# Patient Record
Sex: FEMALE | Race: WHITE | HISPANIC OR LATINO | Employment: FULL TIME | URBAN - METROPOLITAN AREA
[De-identification: names, ages, dates, MRNs, and addresses within clinical notes are randomized per-mention and may not be internally consistent; named-entity substitution may affect disease eponyms.]

---

## 2019-10-01 ENCOUNTER — APPOINTMENT (EMERGENCY)
Dept: CT IMAGING | Facility: HOSPITAL | Age: 39
End: 2019-10-01
Payer: COMMERCIAL

## 2019-10-01 ENCOUNTER — HOSPITAL ENCOUNTER (EMERGENCY)
Facility: HOSPITAL | Age: 39
Discharge: HOME | End: 2019-10-02
Attending: EMERGENCY MEDICINE | Admitting: EMERGENCY MEDICINE
Payer: COMMERCIAL

## 2019-10-01 DIAGNOSIS — K55.1 MESENTERIC VASCULAR INSUFFICIENCY (HCC): ICD-10-CM

## 2019-10-01 DIAGNOSIS — K45.8 INTERNAL HERNIA: ICD-10-CM

## 2019-10-01 DIAGNOSIS — R10.84 GENERALIZED ABDOMINAL PAIN: Primary | ICD-10-CM

## 2019-10-01 LAB
ALBUMIN SERPL BCP-MCNC: 3.9 G/DL (ref 3.5–5)
ALP SERPL-CCNC: 96 U/L (ref 46–116)
ALT SERPL W P-5'-P-CCNC: 7 U/L (ref 12–78)
ANION GAP SERPL CALCULATED.3IONS-SCNC: 10 MMOL/L (ref 4–13)
AST SERPL W P-5'-P-CCNC: 18 U/L (ref 5–45)
BASOPHILS # BLD AUTO: 0.04 THOUSANDS/ΜL (ref 0–0.1)
BASOPHILS NFR BLD AUTO: 0 % (ref 0–1)
BILIRUB SERPL-MCNC: 0.4 MG/DL (ref 0.2–1)
BILIRUB UR QL STRIP: NEGATIVE
BUN SERPL-MCNC: 13 MG/DL (ref 5–25)
CALCIUM SERPL-MCNC: 8.9 MG/DL (ref 8.3–10.1)
CHLORIDE SERPL-SCNC: 100 MMOL/L (ref 100–108)
CLARITY UR: CLEAR
CO2 SERPL-SCNC: 27 MMOL/L (ref 21–32)
COLOR UR: YELLOW
CREAT SERPL-MCNC: 0.69 MG/DL (ref 0.6–1.3)
EOSINOPHIL # BLD AUTO: 0.01 THOUSAND/ΜL (ref 0–0.61)
EOSINOPHIL NFR BLD AUTO: 0 % (ref 0–6)
ERYTHROCYTE [DISTWIDTH] IN BLOOD BY AUTOMATED COUNT: 13.1 % (ref 11.6–15.1)
GFR SERPL CREATININE-BSD FRML MDRD: 108 ML/MIN/1.73SQ M
GLUCOSE SERPL-MCNC: 168 MG/DL (ref 65–140)
GLUCOSE UR STRIP-MCNC: NEGATIVE MG/DL
HCG SERPL QL: NEGATIVE
HCT VFR BLD AUTO: 36.2 % (ref 34.8–46.1)
HGB BLD-MCNC: 11.8 G/DL (ref 11.5–15.4)
HGB UR QL STRIP.AUTO: NEGATIVE
IMM GRANULOCYTES # BLD AUTO: 0.06 THOUSAND/UL (ref 0–0.2)
IMM GRANULOCYTES NFR BLD AUTO: 0 % (ref 0–2)
KETONES UR STRIP-MCNC: NEGATIVE MG/DL
LEUKOCYTE ESTERASE UR QL STRIP: NEGATIVE
LIPASE SERPL-CCNC: 120 U/L (ref 73–393)
LYMPHOCYTES # BLD AUTO: 0.98 THOUSANDS/ΜL (ref 0.6–4.47)
LYMPHOCYTES NFR BLD AUTO: 6 % (ref 14–44)
MCH RBC QN AUTO: 29.1 PG (ref 26.8–34.3)
MCHC RBC AUTO-ENTMCNC: 32.6 G/DL (ref 31.4–37.4)
MCV RBC AUTO: 89 FL (ref 82–98)
MONOCYTES # BLD AUTO: 0.48 THOUSAND/ΜL (ref 0.17–1.22)
MONOCYTES NFR BLD AUTO: 3 % (ref 4–12)
NEUTROPHILS # BLD AUTO: 14.18 THOUSANDS/ΜL (ref 1.85–7.62)
NEUTS SEG NFR BLD AUTO: 91 % (ref 43–75)
NITRITE UR QL STRIP: NEGATIVE
NRBC BLD AUTO-RTO: 0 /100 WBCS
PH UR STRIP.AUTO: 6 [PH]
PLATELET # BLD AUTO: 302 THOUSANDS/UL (ref 149–390)
PMV BLD AUTO: 10.1 FL (ref 8.9–12.7)
POTASSIUM SERPL-SCNC: 4.5 MMOL/L (ref 3.5–5.3)
PROT SERPL-MCNC: 7.8 G/DL (ref 6.4–8.2)
PROT UR STRIP-MCNC: NEGATIVE MG/DL
RBC # BLD AUTO: 4.06 MILLION/UL (ref 3.81–5.12)
SODIUM SERPL-SCNC: 137 MMOL/L (ref 136–145)
SP GR UR STRIP.AUTO: 1.02 (ref 1–1.03)
UROBILINOGEN UR QL STRIP.AUTO: 0.2 E.U./DL
WBC # BLD AUTO: 15.75 THOUSAND/UL (ref 4.31–10.16)

## 2019-10-01 PROCEDURE — 99284 EMERGENCY DEPT VISIT MOD MDM: CPT

## 2019-10-01 PROCEDURE — 81003 URINALYSIS AUTO W/O SCOPE: CPT | Performed by: EMERGENCY MEDICINE

## 2019-10-01 PROCEDURE — 99285 EMERGENCY DEPT VISIT HI MDM: CPT | Performed by: EMERGENCY MEDICINE

## 2019-10-01 PROCEDURE — 85025 COMPLETE CBC W/AUTO DIFF WBC: CPT | Performed by: EMERGENCY MEDICINE

## 2019-10-01 PROCEDURE — 74177 CT ABD & PELVIS W/CONTRAST: CPT

## 2019-10-01 PROCEDURE — 84703 CHORIONIC GONADOTROPIN ASSAY: CPT | Performed by: EMERGENCY MEDICINE

## 2019-10-01 PROCEDURE — 96374 THER/PROPH/DIAG INJ IV PUSH: CPT

## 2019-10-01 PROCEDURE — 83690 ASSAY OF LIPASE: CPT | Performed by: EMERGENCY MEDICINE

## 2019-10-01 PROCEDURE — 96361 HYDRATE IV INFUSION ADD-ON: CPT

## 2019-10-01 PROCEDURE — 96375 TX/PRO/DX INJ NEW DRUG ADDON: CPT

## 2019-10-01 PROCEDURE — 36415 COLL VENOUS BLD VENIPUNCTURE: CPT | Performed by: EMERGENCY MEDICINE

## 2019-10-01 PROCEDURE — 80053 COMPREHEN METABOLIC PANEL: CPT | Performed by: EMERGENCY MEDICINE

## 2019-10-01 RX ORDER — ONDANSETRON 2 MG/ML
4 INJECTION INTRAMUSCULAR; INTRAVENOUS ONCE
Status: COMPLETED | OUTPATIENT
Start: 2019-10-01 | End: 2019-10-01

## 2019-10-01 RX ORDER — HYDROMORPHONE HCL/PF 1 MG/ML
0.5 SYRINGE (ML) INJECTION
Status: DISCONTINUED | OUTPATIENT
Start: 2019-10-01 | End: 2019-10-02 | Stop reason: HOSPADM

## 2019-10-01 RX ADMIN — FAMOTIDINE 20 MG: 10 INJECTION, SOLUTION INTRAVENOUS at 21:47

## 2019-10-01 RX ADMIN — ONDANSETRON 4 MG: 2 INJECTION INTRAMUSCULAR; INTRAVENOUS at 21:47

## 2019-10-01 RX ADMIN — HYDROMORPHONE HYDROCHLORIDE 0.5 MG: 1 INJECTION, SOLUTION INTRAMUSCULAR; INTRAVENOUS; SUBCUTANEOUS at 21:52

## 2019-10-01 RX ADMIN — SODIUM CHLORIDE 1000 ML: 0.9 INJECTION, SOLUTION INTRAVENOUS at 21:47

## 2019-10-01 RX ADMIN — IOHEXOL 50 ML: 240 INJECTION, SOLUTION INTRATHECAL; INTRAVASCULAR; INTRAVENOUS; ORAL at 22:04

## 2019-10-02 VITALS
HEART RATE: 93 BPM | TEMPERATURE: 98 F | DIASTOLIC BLOOD PRESSURE: 52 MMHG | WEIGHT: 171.74 LBS | OXYGEN SATURATION: 100 % | SYSTOLIC BLOOD PRESSURE: 107 MMHG | RESPIRATION RATE: 18 BRPM

## 2019-10-02 PROCEDURE — 96361 HYDRATE IV INFUSION ADD-ON: CPT

## 2019-10-02 RX ADMIN — IOHEXOL 100 ML: 350 INJECTION, SOLUTION INTRAVENOUS at 00:03

## 2019-10-02 NOTE — ED PROVIDER NOTES
Pt Name: Mustapha Gold  MRN: [id number]  [place] 1980  Age/Sex: 37 y o  female  Date of evaluation: 10/1/2019  PCP: No primary care provider on file  CHIEF COMPLAINT    Chief Complaint   Patient presents with    Abdominal Pain     left sided flank pain, radiating throughout her abdomen, vomiting  pain started two hours ago  HPI    37 y o  female presenting with generalized abdominal pain  This pain began as left flank pain, began immediately after a meal hours ago, is now throughout the entire abdomen and back, accompanied by nausea and multiple episodes of nonbloody vomiting  Patient has a history of gastric bypass that she believes with Octavio-en-Y done 5 years ago in [facility], has had similar episodes in the past   She states she believes she still has her gallbladder  She took an oxycodone 3 hours ago with no relief of her symptoms  She denies fever, trauma, chest pain, shortness of breath, changes in urine or bowel movements, other symptoms  HPI      Past Medical and Surgical History    History reviewed  No pertinent past medical history  Past Surgical History:   Procedure Laterality Date    ABDOMINAL SURGERY      GASTRIC BYPASS         History reviewed  No pertinent family history  Social History     Tobacco Use    Smoking status: Never Smoker    Smokeless tobacco: Never Used   Substance Use Topics    Alcohol use: No    Drug use: No           Allergies    No Known Allergies    Home Medications    Prior to Admission medications    Not on File           Review of Systems    Review of Systems   Constitutional: Negative for activity change, chills and fever  HENT: Negative for drooling and facial swelling  Eyes: Negative for pain, discharge and visual disturbance  Respiratory: Negative for apnea, cough, chest tightness, shortness of breath and wheezing  Cardiovascular: Negative for chest pain and leg swelling     Gastrointestinal: Positive for abdominal pain, nausea and vomiting  Negative for constipation and diarrhea  Genitourinary: Negative for difficulty urinating, dysuria and urgency  Musculoskeletal: Negative for arthralgias, back pain and gait problem  Skin: Negative for color change and rash  Neurological: Negative for dizziness, speech difficulty, weakness and headaches  Psychiatric/Behavioral: Negative for agitation, behavioral problems and confusion  All other systems reviewed and negative  Physical Exam      ED Triage Vitals [10/01/19 2040]   Temperature Pulse Respirations Blood Pressure SpO2   98 °F (36 7 °C) 71 20 134/98 100 %      Temp Source Heart Rate Source Patient Position - Orthostatic VS BP Location FiO2 (%)   Oral Monitor Sitting Left arm --      Pain Score       Worst Possible Pain               Physical Exam   Constitutional: She is oriented to person, place, and time  She appears well-developed and well-nourished  She appears distressed  HENT:   Head: Normocephalic and atraumatic  Nose: Nose normal    Mouth/Throat: Oropharynx is clear and moist    Eyes: Pupils are equal, round, and reactive to light  Conjunctivae and EOM are normal    Neck: Normal range of motion  Neck supple  Cardiovascular: Normal rate, regular rhythm, normal heart sounds and intact distal pulses  Pulmonary/Chest: Effort normal and breath sounds normal  No respiratory distress  She has no wheezes  She has no rales  Abdominal: Soft  She exhibits no distension  There is tenderness  There is no rebound and no guarding  Tenderness throughout the entire abdomen, no rebound or guarding, no focal area of worse tenderness identified, patient also complains of CVA tenderness on both sides of back   Musculoskeletal: Normal range of motion  She exhibits no edema or deformity  Neurological: She is alert and oriented to person, place, and time  Skin: Skin is warm and dry  No rash noted  No erythema  Psychiatric: She has a normal mood and affect   Her behavior is normal  Judgment and thought content normal    Nursing note and vitals reviewed             Diagnostic Results      Labs:    Results for orders placed or performed during the hospital encounter of 10/01/19   CBC and differential   Result Value Ref Range    WBC 15 75 (H) 4 31 - 10 16 Thousand/uL    RBC 4 06 3 81 - 5 12 Million/uL    Hemoglobin 11 8 11 5 - 15 4 g/dL    Hematocrit 36 2 34 8 - 46 1 %    MCV 89 82 - 98 fL    MCH 29 1 26 8 - 34 3 pg    MCHC 32 6 31 4 - 37 4 g/dL    RDW 13 1 11 6 - 15 1 %    MPV 10 1 8 9 - 12 7 fL    Platelets 418 404 - 605 Thousands/uL    nRBC 0 /100 WBCs    Neutrophils Relative 91 (H) 43 - 75 %    Immat GRANS % 0 0 - 2 %    Lymphocytes Relative 6 (L) 14 - 44 %    Monocytes Relative 3 (L) 4 - 12 %    Eosinophils Relative 0 0 - 6 %    Basophils Relative 0 0 - 1 %    Neutrophils Absolute 14 18 (H) 1 85 - 7 62 Thousands/µL    Immature Grans Absolute 0 06 0 00 - 0 20 Thousand/uL    Lymphocytes Absolute 0 98 0 60 - 4 47 Thousands/µL    Monocytes Absolute 0 48 0 17 - 1 22 Thousand/µL    Eosinophils Absolute 0 01 0 00 - 0 61 Thousand/µL    Basophils Absolute 0 04 0 00 - 0 10 Thousands/µL   Comprehensive metabolic panel   Result Value Ref Range    Sodium 137 136 - 145 mmol/L    Potassium 4 5 3 5 - 5 3 mmol/L    Chloride 100 100 - 108 mmol/L    CO2 27 21 - 32 mmol/L    ANION GAP 10 4 - 13 mmol/L    BUN 13 5 - 25 mg/dL    Creatinine 0 69 0 60 - 1 30 mg/dL    Glucose 168 (H) 65 - 140 mg/dL    Calcium 8 9 8 3 - 10 1 mg/dL    AST 18 5 - 45 U/L    ALT 7 (L) 12 - 78 U/L    Alkaline Phosphatase 96 46 - 116 U/L    Total Protein 7 8 6 4 - 8 2 g/dL    Albumin 3 9 3 5 - 5 0 g/dL    Total Bilirubin 0 40 0 20 - 1 00 mg/dL    eGFR 108 ml/min/1 73sq m   Lipase   Result Value Ref Range    Lipase 120 73 - 393 u/L   UA w Reflex to Microscopic   Result Value Ref Range    Color, UA Yellow     Clarity, UA Clear     Specific Gravity, UA 1 025 1 003 - 1 030    pH, UA 6 0 4 5, 5 0, 5 5, 6 0, 6 5, 7 0, 7 5, 8 0 Leukocytes, UA Negative Negative    Nitrite, UA Negative Negative    Protein, UA Negative Negative mg/dl    Glucose, UA Negative Negative mg/dl    Ketones, UA Negative Negative mg/dl    Urobilinogen, UA 0 2 0 2, 1 0 E U /dl E U /dl    Bilirubin, UA Negative Negative    Blood, UA Negative Negative   hCG, qualitative pregnancy   Result Value Ref Range    Preg, Serum Negative Negative       All labs reviewed and utilized in the medical decision making process    Radiology:    CT abdomen pelvis with contrast   Final Result      Mild thickening of scattered loops of small bowel in the left hemiabdomen which may represent enteritis  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 likely resulting in diffuse edema within the mesenteric fat  Distended gallbladder with mild intrahepatic biliary ductal dilatation  Correlation for acute cholecystitis is recommended  Mild swelling of the mesenteric vessels suggestive of a chronic internal hernia      Unchanged multilocular complex cystic lesion in the left adnexa  Further evaluation with a pelvic sonogram is recommended  I personally discussed this study with Sharon Lawrence on 10/2/2019 at 12:28 AM                       Workstation performed: [id number]             All radiology studies independently viewed by me and interpreted by the radiologist     Procedure    Procedures        ED Course of Care and Re-Assessments      Symptoms resolved hydromorphone Zofran IV fluids and famotidine  Symptoms do not recur throughout ER course  After CT scan results were available, I discussed in detail with patient and her  and counseled regarding the findings      Medications   HYDROmorphone (DILAUDID) injection 0 5 mg (0 5 mg Intravenous Given 10/1/19 2152)   sodium chloride 0 9 % bolus 1,000 mL (0 mL Intravenous Stopped 10/2/19 0149)   ondansetron (ZOFRAN) injection 4 mg (4 mg Intravenous Given 10/1/19 2147)   famotidine (PEPCID) injection 20 mg (20 mg Intravenous Given 10/1/19 2147)   iohexol (OMNIPAQUE) 240 MG/ML solution 50 mL (50 mL Oral Given 10/1/19 2204)   iohexol (OMNIPAQUE) 350 MG/ML injection (MULTI-DOSE) 100 mL (100 mL Intravenous Given 10/2/19 0003)           FINAL IMPRESSION    Final diagnoses:   Internal hernia   Generalized abdominal pain   Mesenteric vascular insufficiency (HCC)         DISPOSITION/PLAN    Presentation as above with generalized abdominal pain  Presenting symptoms possibly secondary to gastroenteritis based on inflamed bowel loops, very low suspicion for acute cholecystitis based on rapid resolution of symptoms, character and location of the pain, negative Woo sign, lack of fever  Given recurrence of symptoms as well as relation to meal, some concern possible supply demand mismatch of the mesentery especially with stenosis on CT scan  Internal hernia noted but felt not contributory to today's symptoms as it appears chronic and there is no obstruction  Patient and  counseled extensively regarding differential diagnosis, they felt comfortable with plan of close outpatient follow-up and return if symptoms return  At this point, they stated they are unsure if she would prefer to go back to [facility] to see her surgeon there or follow-up locally, provided with follow-up information for local bariatric surgeon  Hemodynamically stable and comfortable at time of discharge  Tolerating p o  In ER without difficulty    Time reflects when diagnosis was documented in both MDM as applicable and the Disposition within this note     Time User Action Codes Description Comment    10/2/2019  1:41 AM Ruth Ann CALLAHAN Add [K45 8] Internal hernia     10/2/2019  1:41 AM Ruth Ann CALLAHAN Add [R10 84] Generalized abdominal pain     10/2/2019  1:41 AM Ruth Ann CALLAHAN Modify [K45 8] Internal hernia     10/2/2019  1:41 AM Ruth Ann CALLAHAN Modify [R10 84] Generalized abdominal pain     10/2/2019  1:44 AM Alida Jose Luis Add [K55 1] Mesenteric vascular insufficiency [facility])       ED Disposition     ED Disposition Condition Date/Time Comment    Discharge Stable Wed Oct 2, 2019  1:41 AM Monty Isabel discharge to home/self care  Follow-up Information     Follow up With Specialties Details Why Contact Info Additional Aba Kendrick Weight [address] Call in 1 day To discuss your symptoms and the abnormalities on the CT scan [address] [address]  [facility] [address] [facility] [address] [place] [address], C/Maurice Guerra  [facility], [place], [place], [address] [facility] Emergency Department Emergency Medicine Go to  If symptoms worsen [address] [facility] [address] [phone] [place] ED, [address] [place], [place], [address]            PATIENT REFERRED TO:    Preeti Greene Laura 13  [phone]  Call in 1 day  To discuss your symptoms and the abnormalities on the CT scan    [facility] Emergency Department  [address] [facility] [address] [phone]  Go to   If symptoms worsen      DISCHARGE MEDICATIONS:    Patient's Medications    No medications on file       No discharge procedures on file           MD Airam Velasco MD  10/02/19 0285

## 2019-10-16 ENCOUNTER — CONSULT (OUTPATIENT)
Dept: SURGERY | Facility: CLINIC | Age: 39
End: 2019-10-16
Payer: COMMERCIAL

## 2019-10-16 VITALS
DIASTOLIC BLOOD PRESSURE: 72 MMHG | TEMPERATURE: 98 F | SYSTOLIC BLOOD PRESSURE: 114 MMHG | HEART RATE: 81 BPM | WEIGHT: 164 LBS

## 2019-10-16 DIAGNOSIS — R10.11 POSTPRANDIAL ABDOMINAL PAIN IN RIGHT UPPER QUADRANT: Primary | ICD-10-CM

## 2019-10-16 PROBLEM — Z98.84 HISTORY OF GASTRIC BYPASS: Status: ACTIVE | Noted: 2019-10-16

## 2019-10-16 PROBLEM — D50.8 IRON DEFICIENCY ANEMIA SECONDARY TO INADEQUATE DIETARY IRON INTAKE: Status: ACTIVE | Noted: 2019-10-16

## 2019-10-16 PROBLEM — N92.0 MENORRHAGIA WITH REGULAR CYCLE: Status: ACTIVE | Noted: 2019-10-16

## 2019-10-16 PROBLEM — N80.9 ENDOMETRIOSIS: Status: ACTIVE | Noted: 2019-10-16

## 2019-10-16 PROCEDURE — 99204 OFFICE O/P NEW MOD 45 MIN: CPT | Performed by: SURGERY

## 2019-10-16 NOTE — PROGRESS NOTES
Office Visit - General Surgery  Prem White MRN: [id number]  Encounter: [id number]    Assessment and Plan    Problem List Items Addressed This Visit     None      Visit Diagnoses     Postprandial abdominal pain in right upper quadrant    -  Primary    Relevant Orders    NM hepatobiliary w rx        I discussed with her despite her imaging being negative for stones (which would be radiopaque stones given CT) a hepatobiliary scan would also evaluate for gallbladder dysfunction  GB disease does increase in frequency after gastric bypass  If that imaging is negative I would recommend she be seen by bariatric surgery to evaluate if she does indeed have a chronic internal hernia that is causing her symptoms  This was explained to her with the help of my MA and her  in [facility] [place]  Chief Complaint:  Prem White is a 37 y o  female who presents for Advice Only (hernia consult)    Subjective    Ms Cavazos is following up after a recent ED visit for flank pain  She has a history of Octavio En Y gastric bypass in [facility] about 5 years ago  She has had some associated nausea and vomiting  She does have postprandial pain but not have pain after every meal  It is on the right side/midepigastrium and into the flank  She denies palpitations, shaking, fevers, chills  CT scan showed mild swelling of the mesenteric vessels concerning for internal hernia, a left adnexal cystic mass, a distended gallbladder without stones and mild intra and extrahepatic ductal dilation  Past Medical History  History reviewed  No pertinent past medical history  Past Surgical History  Past Surgical History:   Procedure Laterality Date    ABDOMINAL SURGERY      GASTRIC BYPASS         Family History  History reviewed  No pertinent family history      Social History  Social History     Socioeconomic History    Marital status: /Civil Union     Spouse name: None    Number of children: None    Years of education: None    Highest education level: None   Occupational History    None   Social Needs    Financial resource strain: None    Food insecurity:     Worry: None     Inability: None    Transportation needs:     Medical: None     Non-medical: None   Tobacco Use    Smoking status: Never Smoker    Smokeless tobacco: Never Used   Substance and Sexual Activity    Alcohol use: No    Drug use: No    Sexual activity: None   Lifestyle    Physical activity:     Days per week: None     Minutes per session: None    Stress: None   Relationships    Social connections:     Talks on phone: None     Gets together: None     Attends Pent[employer]ism service: None     Active member of club or organization: None     Attends meetings of clubs or organizations: None     Relationship status: None    Intimate partner violence:     Fear of current or ex partner: None     Emotionally abused: None     Physically abused: None     Forced sexual activity: None   Other Topics Concern    None   Social History Narrative    None        Medications  No current outpatient medications on file prior to visit  No current facility-administered medications on file prior to visit  Allergies  No Known Allergies    Review of Systems   Constitutional: Positive for appetite change  Negative for activity change and unexpected weight change  HENT: Negative for congestion, hearing loss, sore throat and trouble swallowing  Eyes: Negative for discharge and visual disturbance  Respiratory: Negative for cough, chest tightness, shortness of breath and wheezing  Cardiovascular: Negative for chest pain and palpitations  Gastrointestinal: Positive for abdominal pain, nausea and vomiting  Negative for abdominal distention  Endocrine: Negative for cold intolerance and heat intolerance  Genitourinary: Negative for difficulty urinating  Musculoskeletal: Negative for gait problem  Skin: Negative for color change, rash and wound     Neurological: Negative for dizziness, speech difficulty and headaches  Psychiatric/Behavioral: Negative for behavioral problems and confusion  The patient is not nervous/anxious  Objective  Vitals:    10/16/19 1707   BP: 114/72   Pulse: 81   Temp: 98 °F (36 7 °C)       Physical Exam   Constitutional: She is oriented to person, place, and time  She appears well-developed and well-nourished  No distress  HENT:   Head: Normocephalic and atraumatic  Eyes: Pupils are equal, round, and reactive to light  EOM are normal    Neck: Normal range of motion  Neck supple  Cardiovascular: Normal rate and regular rhythm  Pulmonary/Chest: Effort normal and breath sounds normal  No respiratory distress  Abdominal: Soft  She exhibits no distension and no mass  Tenderness: very mild tenderness diffusely  There is no rebound and no guarding  Musculoskeletal: Normal range of motion  Neurological: She is alert and oriented to person, place, and time  Skin: Skin is warm and dry  She is not diaphoretic  Psychiatric: She has a normal mood and affect  Nursing note and vitals reviewed

## 2019-10-17 ENCOUNTER — TELEPHONE (OUTPATIENT)
Dept: SURGERY | Facility: CLINIC | Age: 39
End: 2019-10-17

## 2019-10-17 NOTE — TELEPHONE ENCOUNTER
Called patient to inform her her HIDAA scan is on 11/15/2019 at 8:30am needs to arrive 15 minutes early to register   I informed the patient that she will need to be fasting 4-6 hours before the procedure

## 2020-08-19 DIAGNOSIS — Z98.84 BARIATRIC SURGERY STATUS: Primary | ICD-10-CM

## 2023-03-07 ENCOUNTER — OFFICE VISIT (OUTPATIENT)
Dept: FAMILY MEDICINE CLINIC | Facility: CLINIC | Age: 43
End: 2023-03-07

## 2023-03-07 ENCOUNTER — APPOINTMENT (OUTPATIENT)
Dept: LAB | Facility: CLINIC | Age: 43
End: 2023-03-07

## 2023-03-07 VITALS
BODY MASS INDEX: 32.1 KG/M2 | HEIGHT: 64 IN | OXYGEN SATURATION: 99 % | RESPIRATION RATE: 18 BRPM | WEIGHT: 188 LBS | SYSTOLIC BLOOD PRESSURE: 100 MMHG | DIASTOLIC BLOOD PRESSURE: 62 MMHG | HEART RATE: 105 BPM | TEMPERATURE: 97.3 F

## 2023-03-07 DIAGNOSIS — Z76.89 ENCOUNTER TO ESTABLISH CARE: Primary | ICD-10-CM

## 2023-03-07 DIAGNOSIS — D50.8 IRON DEFICIENCY ANEMIA SECONDARY TO INADEQUATE DIETARY IRON INTAKE: ICD-10-CM

## 2023-03-07 DIAGNOSIS — Z98.84 HISTORY OF GASTRIC BYPASS: ICD-10-CM

## 2023-03-07 DIAGNOSIS — Z12.31 ENCOUNTER FOR SCREENING MAMMOGRAM FOR BREAST CANCER: ICD-10-CM

## 2023-03-07 DIAGNOSIS — Z79.899 MEDICATION MANAGEMENT: ICD-10-CM

## 2023-03-07 LAB
ALBUMIN SERPL BCP-MCNC: 3.9 G/DL (ref 3.5–5)
ALP SERPL-CCNC: 71 U/L (ref 46–116)
ALT SERPL W P-5'-P-CCNC: 11 U/L (ref 12–78)
ANION GAP SERPL CALCULATED.3IONS-SCNC: 2 MMOL/L (ref 4–13)
AST SERPL W P-5'-P-CCNC: 15 U/L (ref 5–45)
BASOPHILS # BLD AUTO: 0.05 THOUSANDS/ÂΜL (ref 0–0.1)
BASOPHILS NFR BLD AUTO: 1 % (ref 0–1)
BILIRUB SERPL-MCNC: 0.23 MG/DL (ref 0.2–1)
BUN SERPL-MCNC: 11 MG/DL (ref 5–25)
CALCIUM SERPL-MCNC: 9.5 MG/DL (ref 8.3–10.1)
CHLORIDE SERPL-SCNC: 107 MMOL/L (ref 96–108)
CHOLEST SERPL-MCNC: 231 MG/DL
CO2 SERPL-SCNC: 25 MMOL/L (ref 21–32)
CREAT SERPL-MCNC: 0.6 MG/DL (ref 0.6–1.3)
EOSINOPHIL # BLD AUTO: 0.05 THOUSAND/ÂΜL (ref 0–0.61)
EOSINOPHIL NFR BLD AUTO: 1 % (ref 0–6)
ERYTHROCYTE [DISTWIDTH] IN BLOOD BY AUTOMATED COUNT: 15.6 % (ref 11.6–15.1)
FERRITIN SERPL-MCNC: 5 NG/ML (ref 8–388)
GFR SERPL CREATININE-BSD FRML MDRD: 111 ML/MIN/1.73SQ M
GLUCOSE P FAST SERPL-MCNC: 117 MG/DL (ref 65–99)
HCT VFR BLD AUTO: 31.8 % (ref 34.8–46.1)
HDLC SERPL-MCNC: 115 MG/DL
HGB BLD-MCNC: 9.6 G/DL (ref 11.5–15.4)
IMM GRANULOCYTES # BLD AUTO: 0.02 THOUSAND/UL (ref 0–0.2)
IMM GRANULOCYTES NFR BLD AUTO: 0 % (ref 0–2)
IRON SATN MFR SERPL: 4 % (ref 15–50)
IRON SERPL-MCNC: 17 UG/DL (ref 50–170)
LDLC SERPL CALC-MCNC: 102 MG/DL (ref 0–100)
LYMPHOCYTES # BLD AUTO: 2.15 THOUSANDS/ÂΜL (ref 0.6–4.47)
LYMPHOCYTES NFR BLD AUTO: 32 % (ref 14–44)
MCH RBC QN AUTO: 23.4 PG (ref 26.8–34.3)
MCHC RBC AUTO-ENTMCNC: 30.2 G/DL (ref 31.4–37.4)
MCV RBC AUTO: 77 FL (ref 82–98)
MONOCYTES # BLD AUTO: 0.51 THOUSAND/ÂΜL (ref 0.17–1.22)
MONOCYTES NFR BLD AUTO: 8 % (ref 4–12)
NEUTROPHILS # BLD AUTO: 4.05 THOUSANDS/ÂΜL (ref 1.85–7.62)
NEUTS SEG NFR BLD AUTO: 58 % (ref 43–75)
NONHDLC SERPL-MCNC: 116 MG/DL
NRBC BLD AUTO-RTO: 0 /100 WBCS
PLATELET # BLD AUTO: 372 THOUSANDS/UL (ref 149–390)
PMV BLD AUTO: 11.6 FL (ref 8.9–12.7)
POTASSIUM SERPL-SCNC: 4 MMOL/L (ref 3.5–5.3)
PROT SERPL-MCNC: 7.4 G/DL (ref 6.4–8.4)
RBC # BLD AUTO: 4.11 MILLION/UL (ref 3.81–5.12)
SODIUM SERPL-SCNC: 134 MMOL/L (ref 135–147)
TIBC SERPL-MCNC: 448 UG/DL (ref 250–450)
TRIGL SERPL-MCNC: 68 MG/DL
WBC # BLD AUTO: 6.83 THOUSAND/UL (ref 4.31–10.16)

## 2023-03-07 RX ORDER — DIPHENOXYLATE HYDROCHLORIDE AND ATROPINE SULFATE 2.5; .025 MG/1; MG/1
1 TABLET ORAL DAILY
COMMUNITY

## 2023-03-07 NOTE — PATIENT INSTRUCTIONS
Get blood work done  Anemia   LO QUE NECESITA SABER:   La anemia es cuando el número de glóbulos rojos o la cantidad de [employer] glóbulos rojos es baja  La hemoglobina es abdirizak proteína que ayuda a transportar el oxígeno a través de lott cuerpo  Los glóbulos rojos usan el vanessa para crear hemoglobina  La anemia podría desarrollarse si lott cuerpo no tiene suficiente vanessa  También podría desarrollarse si lott cuerpo no produce suficientes glóbulos rojos o si ellos mueren antes de que lott cuerpo pueda producirlos  INSTRUCCIONES SOBRE EL MILDRED HOSPITALARIA:   Llame al [employer] de emergencias (911 en los [place]), o pídale a alguien que llame si:  Usted pierde el conocimiento  Usted tiene un dolor intenso en el pecho  Regrese a la aleta de emergencias si:  Usted tiene evacuaciones intestinales oscuras o con lyndsay  Llame a lott médico si:  Jennifer síntomas empeoran, aún después del [place]  Usted tiene preguntas o inquietudes acerca de lott condición o cuidado  Medicamentos:  Suplementos de vanessa o ácido fólico ayudan a aumentar jennifer glóbulos rojos y los niveles de hemoglobina  Inyecciones de vitamina B12 podrían ayudar a aumentar el conteo de jennifer glóbulos rojos y a disminuir jennifer síntomas  Pregunte a lott médico cómo se inyecta la B12  [place] jennifer medicamentos nancy se le haya indicado  Consulte con lott médico si usted jaymie que lott medicamento no le está ayudando o si presenta efectos secundarios  Infórmele al médico si usted es alérgico a algún medicamento  Mantenga abdirizak lista actualizada de los [place], las vitaminas y los productos herbales que hilda  Incluya los siguientes datos de los medicamentos: cantidad, frecuencia y motivo de administración  Traiga con usted la lista o los envases de las píldoras a jennifer citas de seguimiento  Lleve la lista de los medicamentos con usted en chanel de abdirizak emergencia  Evite la anemia: Consuma alimentos sanos altos en vanessa y vitamina C    Las nueces, carne, vegetales de hojas roque oscuro y frijoles son altos en vanessa y proteína  La vitamina C ayuda a lott cuerpo a absorber el vanessa  Los [employer] en vitamina C incluyen naranjas y otros cítricos  Pídale a lott médico abdirizak lista de otros alimentos altos en vanessa y vitamina C  Pregunte si usted necesita llevar abdirizak dieta especial           Acuda a la consulta de control con lott médico según las indicaciones: Anote king preguntas para que se acuerde de hacerlas shreya king visitas  © [facility] 2022 Information is for End User's use only and may not be sold, redistributed or otherwise used for commercial purposes  Esta información es sólo para uso en educación  Lott intención no es darle un consejo médico sobre enfermedades o tratamientos  Colsulte con lott [employer] farmacéutico antes de seguir cualquier régimen médico para saber si es seguro y efectivo para usted

## 2023-03-07 NOTE — PROGRESS NOTES
Name: Josef Mendoza      : 1980      MRN: [id number]  Encounter Provider: CHERI Kincaid  Encounter Date: 3/7/2023   Encounter department: [address]     1  Encounter to establish care  Assessment & Plan:  Here to establish care  Last PCP visit 1 year ago  Mammogram ordered  Colonoscopy UTD, Will scheduled PAP Smear  Routine labs ordered  Follow-up in 1 week       2  Iron deficiency anemia secondary to inadequate dietary iron intake  Assessment & Plan:  Hx of Iron Deficiency anemia that required Iron infusions  Last infusion 7 months ago  Symptoms of anemia have returned  Denies any source of overt bleeding  Iron Panel ordered  Discussed reviewing labs and ordering infusion as indicated    Orders:  -     CBC and differential; Future  -     Iron Panel (Includes Ferritin, Iron Sat%, Iron, and TIBC); Future    3  History of gastric bypass    4  Encounter for screening mammogram for breast cancer  -     Mammo screening bilateral w 3d & cad; Future; Expected date: 2023    5  Medication management  -     Comprehensive metabolic panel; Future  -     Lipid panel; Future      BMI Counseling: Body mass index is 32 1 kg/m²  The BMI is above normal  Nutrition recommendations include decreasing portion sizes, encouraging healthy choices of fruits and vegetables, consuming healthier snacks, limiting drinks that contain sugar, reducing intake of saturated and trans fat and reducing intake of cholesterol  Exercise recommendations include exercising 3-5 times per week  No pharmacotherapy was ordered  Rationale for BMI follow-up plan is due to patient being overweight or obese  Depression Screening and Follow-up Plan: Patient was screened for depression during today's encounter  They screened negative with a PHQ-2 score of 0  Subjective      Patient is here to establish care    Last primary care provider- 1 year ago at [facility]  Past medical history- Iron Deficiency anemia r/t Gastric Bypass  Past surgical history- 9 years ago Gastric Bypass  Social history-   - Yes   Kids-No   Employment- No- off work due to back injury for the last 8 months  Smoker- No  Alcohol- No   Other drugs- no  Last diagnostic labs screening-8 months ago in [address] [place] [address] exam- 9 months ago  Eyes- 3 months ago  Mammogram- 9 years ago  Found lump it was begnign  Cervical cancer screening- last PAP 2 years ago  Colonoscopy- 8 months ago  It was normal  Current concerns- would like to check the iron  Having feelings of anemia like tachycardia, fatigue, and cold intolerance  Symptoms of Pica  Having heart palpitations        Review of Systems   Constitutional: Positive for appetite change and fatigue  Complaints of Pica like symptoms  Craving dirt and other non food items   HENT: Negative  Eyes: Negative  Respiratory: Positive for shortness of breath  Negative for cough  SOB on excertion   Cardiovascular: Positive for palpitations  Gastrointestinal: Negative  Endocrine: Positive for cold intolerance  Genitourinary: Negative  Allergic/Immunologic: Negative  Neurological: Negative  Hematological: Negative  Psychiatric/Behavioral: Negative  Current Outpatient Medications on File Prior to Visit   Medication Sig   • multivitamin (THERAGRAN) TABS Take 1 tablet by mouth daily       Objective     /62   Pulse 105   Temp (!) 97 3 °F (36 3 °C) (Temporal)   Resp 18   Ht 5' 4 17" (1 63 m)   Wt 85 3 kg (188 lb)   SpO2 99%   BMI 32 10 kg/m²     Physical Exam  Vitals and nursing note reviewed  Constitutional:       Appearance: Normal appearance  She is well-developed  She is obese  Cardiovascular:      Rate and Rhythm: Regular rhythm  Tachycardia present  Pulses: Normal pulses  Heart sounds: Normal heart sounds  No murmur heard  Pulmonary:      Effort: Pulmonary effort is normal       Breath sounds: Normal breath sounds   No wheezing or rhonchi  Abdominal:      General: Bowel sounds are normal  There is no distension  Palpations: Abdomen is soft  Skin:     General: Skin is warm and dry  Neurological:      General: No focal deficit present  Mental Status: She is alert and oriented to person, place, and time  Mental status is at baseline  Psychiatric:         Mood and Affect: Mood normal          Behavior: Behavior normal          Thought Content:  Thought content normal          Judgment: Judgment normal        CHERI Melgar

## 2023-03-07 NOTE — ASSESSMENT & PLAN NOTE
Here to establish care  Last PCP visit 1 year ago  Mammogram ordered  Colonoscopy UTD, Will scheduled PAP Smear  Routine labs ordered   Follow-up in 1 week

## 2023-03-07 NOTE — ASSESSMENT & PLAN NOTE
Hx of Iron Deficiency anemia that required Iron infusions  Last infusion 7 months ago  Symptoms of anemia have returned  Denies any source of overt bleeding  Iron Panel ordered   Discussed reviewing labs and ordering infusion as indicated

## 2023-03-09 ENCOUNTER — RA CDI HCC (OUTPATIENT)
Dept: OTHER | Facility: HOSPITAL | Age: 43
End: 2023-03-09

## 2023-03-09 NOTE — PROGRESS NOTES
[facility]  [facility]       Chart reviewed, no opportunity found: CHART REVIEWED, NO OPPORTUNITY FOUND        Patients Insurance        Commercial Insurance: [address] [place] [address]

## 2023-03-15 ENCOUNTER — OFFICE VISIT (OUTPATIENT)
Dept: FAMILY MEDICINE CLINIC | Facility: CLINIC | Age: 43
End: 2023-03-15

## 2023-03-15 VITALS
HEART RATE: 80 BPM | DIASTOLIC BLOOD PRESSURE: 68 MMHG | TEMPERATURE: 97.5 F | SYSTOLIC BLOOD PRESSURE: 100 MMHG | OXYGEN SATURATION: 97 % | WEIGHT: 190 LBS | RESPIRATION RATE: 18 BRPM | HEIGHT: 64 IN | BODY MASS INDEX: 32.44 KG/M2

## 2023-03-15 DIAGNOSIS — F51.01 PRIMARY INSOMNIA: ICD-10-CM

## 2023-03-15 DIAGNOSIS — N92.0 MENORRHAGIA WITH REGULAR CYCLE: ICD-10-CM

## 2023-03-15 DIAGNOSIS — Z00.00 ANNUAL PHYSICAL EXAM: Primary | ICD-10-CM

## 2023-03-15 DIAGNOSIS — D50.8 IRON DEFICIENCY ANEMIA SECONDARY TO INADEQUATE DIETARY IRON INTAKE: ICD-10-CM

## 2023-03-15 DIAGNOSIS — D50.8 IRON DEFICIENCY ANEMIA SECONDARY TO INADEQUATE DIETARY IRON INTAKE: Primary | ICD-10-CM

## 2023-03-15 RX ORDER — TRAZODONE HYDROCHLORIDE 50 MG/1
50 TABLET ORAL
Qty: 30 TABLET | Refills: 3 | Status: SHIPPED | OUTPATIENT
Start: 2023-03-15 | End: 2023-03-15 | Stop reason: SDUPTHER

## 2023-03-15 RX ORDER — TRAZODONE HYDROCHLORIDE 50 MG/1
50 TABLET ORAL
Qty: 30 TABLET | Refills: 3 | Status: SHIPPED | OUTPATIENT
Start: 2023-03-15

## 2023-03-15 RX ORDER — SODIUM CHLORIDE 9 MG/ML
20 INJECTION, SOLUTION INTRAVENOUS ONCE
OUTPATIENT
Start: 2023-03-15

## 2023-03-15 NOTE — PATIENT INSTRUCTIONS
Wellness Visit for Adults   AMBULATORY CARE:   A wellness visit  is when you see your healthcare provider to get screened for health problems  Your healthcare provider will also give you advice on how to stay healthy  Write down your questions so you remember to ask them  Ask your healthcare provider how often you should have a wellness visit  What happens at a wellness visit:  Your healthcare provider will ask about your health, and your family history of health problems  This includes high blood pressure, heart disease, and cancer  He or she will ask if you have symptoms that concern you, if you smoke, and about your mood  You may also be asked about your intake of medicines, supplements, food, and alcohol  Any of the following may be done: Your weight  will be checked  Your height may also be checked so your body mass index (BMI) can be calculated  Your BMI shows if you are at a healthy weight  Your blood pressure  and heart rate will be checked  Your temperature may also be checked  Blood and urine tests  may be done  Blood tests may be done to check your cholesterol levels  Abnormal cholesterol levels increase your risk for heart disease and stroke  You may also need a blood or urine test to check for diabetes if you are at increased risk  Urine tests may be done to look for signs of an infection or kidney disease  A physical exam  includes checking your heartbeat and lungs with a stethoscope  Your healthcare provider may also check your skin to look for sun damage  Screening tests  may be recommended  A screening test is done to check for diseases that may not cause symptoms  The screening tests you may need depend on your age, gender, family history, and lifestyle habits  For example, colorectal screening may be recommended if you are 48years old or older  Screening tests you need if you are a woman:   A Pap smear  is used to screen for cervical cancer   Pap smears are usually done every 3 to 5 years depending on your age  You may need them more often if you have had abnormal Pap smear test results in the past  Ask your healthcare provider how often you should have a Pap smear  A mammogram  is an x-ray of your breasts to screen for breast cancer  Experts recommend mammograms every 2 years starting at age 48 years  You may need a mammogram at age 52 years or younger if you have an increased risk for breast cancer  Talk to your healthcare provider about when you should start having mammograms and how often you need them  Vaccines you may need:   Get an influenza vaccine  every year  The influenza vaccine protects you from the flu  Several types of viruses cause the flu  The viruses change over time, so new vaccines are made each year  Get a tetanus-diphtheria (Td) booster vaccine  every 10 years  This vaccine protects you against tetanus and diphtheria  Tetanus is a severe infection that may cause painful muscle spasms and lockjaw  Diphtheria is a severe bacterial infection that causes a thick covering in the back of your mouth and throat  Get a human papillomavirus (HPV) vaccine  if you are female and aged 23 to 32 or male 23 to 24 and never received it  This vaccine protects you from HPV infection  HPV is the most common infection spread by sexual contact  HPV may also cause vaginal, penile, and anal cancers  Get a pneumococcal vaccine  if you are aged 72 years or older  The pneumococcal vaccine is an injection given to protect you from pneumococcal disease  Pneumococcal disease is an infection caused by pneumococcal bacteria  The infection may cause pneumonia, meningitis, or an ear infection  Get a shingles vaccine  if you are 60 or older, even if you have had shingles before  The shingles vaccine is an injection to protect you from the varicella-zoster virus  This is the same virus that causes chickenpox   Shingles is a painful rash that develops in people who had chickenpox or have been exposed to the virus  How to eat healthy:  My Plate is a model for planning healthy meals  It shows the types and amounts of foods that should go on your plate  Fruits and vegetables make up about half of your plate, and grains and protein make up the other half  A serving of dairy is included on the side of your plate  The amount of calories and serving sizes you need depends on your age, gender, weight, and height  Examples of healthy foods are listed below:  Eat a variety of vegetables  such as dark green, red, and orange vegetables  You can also include canned vegetables low in sodium (salt) and frozen vegetables without added butter or sauces  Eat a variety of fresh fruits , canned fruit in 100% juice, frozen fruit, and dried fruit  Include whole grains  At least half of the grains you eat should be whole grains  Examples include whole-wheat bread, wheat pasta, brown rice, and whole-grain cereals such as oatmeal     Eat a variety of protein foods such as seafood (fish and shellfish), lean meat, and poultry without skin (turkey and chicken)  Examples of lean meats include pork leg, shoulder, or tenderloin, and beef round, sirloin, tenderloin, and extra lean ground beef  Other protein foods include eggs and egg substitutes, beans, peas, soy products, nuts, and seeds  Choose low-fat dairy products such as skim or 1% milk or low-fat yogurt, cheese, and cottage cheese  Limit unhealthy fats  such as butter, hard margarine, and shortening  Exercise:  Exercise at least 30 minutes per day on most days of the week  Some examples of exercise include walking, biking, dancing, and swimming  You can also fit in more physical activity by taking the stairs instead of the elevator or parking farther away from stores  Include muscle strengthening activities 2 days each week  Regular exercise provides many health benefits   It helps you manage your weight, and decreases your risk for type 2 diabetes, heart disease, stroke, and high blood pressure  Exercise can also help improve your mood  Ask your healthcare provider about the best exercise plan for you  General health and safety guidelines:   Do not smoke  Nicotine and other chemicals in cigarettes and cigars can cause lung damage  Ask your healthcare provider for information if you currently smoke and need help to quit  E-cigarettes or smokeless tobacco still contain nicotine  Talk to your healthcare provider before you use these products  Limit alcohol  A drink of alcohol is 12 ounces of beer, 5 ounces of wine, or 1½ ounces of liquor  Lose weight, if needed  Being overweight increases your risk of certain health conditions  These include heart disease, high blood pressure, type 2 diabetes, and certain types of cancer  Protect your skin  Do not sunbathe or use tanning beds  Use sunscreen with a SPF 15 or higher  Apply sunscreen at least 15 minutes before you go outside  Reapply sunscreen every 2 hours  Wear protective clothing, hats, and sunglasses when you are outside  Drive safely  Always wear your seatbelt  Make sure everyone in your car wears a seatbelt  A seatbelt can save your life if you are in an accident  Do not use your cell phone when you are driving  This could distract you and cause an accident  Pull over if you need to make a call or send a text message  Practice safe sex  Use latex condoms if are sexually active and have more than one partner  Your healthcare provider may recommend screening tests for sexually transmitted infections (STIs)  Wear helmets, lifejackets, and protective gear  Always wear a helmet when you ride a bike or motorcycle, go skiing, or play sports that could cause a head injury  Wear protective equipment when you play sports  Wear a lifejacket when you are on a boat or doing water sports      © Copyright Clint Esparza 2022 Information is for End User's use only and may not be sold, redistributed or otherwise used for commercial purposes  The above information is an  only  It is not intended as medical advice for individual conditions or treatments  Talk to your doctor, nurse or pharmacist before following any medical regimen to see if it is safe and effective for you  El colesterol y lott quirino   CUIDADO AMBULATORIO:   El colesterol es abdirizak sustancia cerosa y Milan Mely  Lott cuerpo utiliza el colesterol para fabricar hormonas y células nuevas y para proteger los nervios  El colesterol es producido por lott cuerpo  También proviene de ciertos alimentos que come, nancy la carne y los productos lácteos  Lott médico puede ayudarle a establecer metas para jennifer niveles de colesterol  Puede ayudarle a crear un plan para alcanzar jennifer metas  Objetivos de nivel de colesterol: Jennifer metas de colesterol dependen de lott riesgo de enfermedad cardíaca, lott edad y otras afecciones médicas  Las siguientes son reglas generales al respecto:  El colesterol total incluye lipoproteínas de baja densidad (LDL), lipoproteínas de kortney densidad (HDL) y triglicéridos  El nivel de colesterol total debe ser inferior a 200 mg/dL y es mejor a unos 150 mg/dL  El colesterol LDL se denomina colesterol florencio  porque forma placas en las arterias  Cuando la placa se acumula, las arterias se estrechan, y reduce el flujo de [place]  Cuando la placa reduce el flujo de la lyndsay al corazón, es probable que usted tenga dolor en el pecho  Si la placa obstruye completamente abdirizak arteria que lleva lyndsay al corazón, usted podría sufrir un ataque cardíaco  La placa puede romperse y formar coágulos de [place]  Los coágulos de lyndsay podrían bloquear las arterias de lott cerebro y causarle un derrame  El nivel debe ser inferior a 130 mg/dL y es mejor a unos 100 mg/dL  El colesterol HDL se llama colesterol baxter  porque ayuda a eliminar el colesterol LDL de las arterias  Lo hace al adherirse al colesterol LDL y llevarlo a lott hígado   El hígado descompone el colesterol LDL para que lott cuerpo pueda deshacerse de él  Los niveles altos de colesterol HDL pueden ayudar a prevenir ataques cardíacos y accidentes cerebrovasculares  Los niveles bajos de colesterol HDL pueden aumentar el riesgo de enfermedad cardíaca, ataque cardíaco y accidente cerebrovascular  El nivel debe ser de 60 mg/dL o superior  Los triglicéridos son un tipo de grasa que almacenan la energía de los alimentos que usted come  Los [employer] de triglicéridos también causan la acumulación de placa  [place] puede aumentar el riesgo de un ataque cardíaco o derrame cerebral  Si lott nivel de triglicéridos es alto, lott nivel de colesterol LDL también puede ser alto  El nivel debe ser inferior a 150 mg/dL  Cualquiera de los siguientes factores puede aumentar lott riesgo de presentar colesterol alto:  Fumar cigarrillos    El sobrepeso o la obesidad, o no realizar suficiente ejercicio    Aura grandes cantidades de alcohol    Afecciones médicas, nancy hipertensión (presión arterial kortney) o diabetes    Ciertos genes pasan de king padres a usted  Ser mayor de 72 años    Lo que necesita saber sobre el control de king niveles de colesterol: Los adultos de 20 a 45 años de edad deben controlar king niveles de colesterol cada 4 a 6 años  Los adultos de [address] controlar lott colesterol cada 1 a 2 años  Puede que necesite controlar lott colesterol más a menudo, o a abdirizak edad más temprana, si tiene factores de riesgo para enfermedades del corazón  También deberá controlar lott colesterol más a menudo si tiene otras condiciones de [facility], nancy la diabetes  Los análisis de [facility] se [place] para verificar los niveles de [place]  Los análisis de lyndsay miden los niveles de triglicéridos, colesterol LDL y colesterol HDL  Cómo las grasas saludables afectan king niveles de colesterol: Las grasas saludables, también llamadas grasas insaturadas, ayudan a reducir el colesterol LDL y los triglicéridos   Freya Solan saludables incluyen lo siguiente:  Las grasas monoinsaturadas se encuentran en alimentos nancy el aceite de [place], aceite canola, [place] de [place], nueces y [place]  Las [employer], nancy grasas Omega 3, se encuentran en pescados, nancy el salmón, la trucha y [place] breeze  También se pueden encontrar en alimentos vegetales nancy la [place] Alonso nueces y soya    Cómo las grasas poco saludables afectan king niveles de colesterol: Las grasas no saludables pueden aumentar el colesterol LDL y los triglicéridos  Estas grasas se encuentran en alimentos con alto contenido de colesterol, grasas saturadas y grasas trans:  El colesterol se encuentra en los [place], productos lácteos y carne  Las grasas saturadas se encuentra en la [facility] y[place], el queso, el helado, la [place] entera y aceite de [place]  La grasa saturada se encuentra también en carne, nancy salchichas, justine caliente, y [place]  La grasa trans se encuentra en los aceites líquidos y se [place] en los alimentos fritos y horneados  Los alimentos que contienen grasas trans [employer] huong fritas, galletas saladas, molletes, pan [place], palomitas de maíz de microondas y galletas dulces  El tratamiento para el colesterol alto también disminuirá lott riesgo de enfermedad cardíaca, ataque cardíaco y derrame cerebral  Es posible que deba seguir alguno de los siguientes tratamientos:  Cambios en el estilo de pallavi pueden incluir comida, ejercicio, pérdida de peso y dejar de fumar  Puede también que necesite disminuir la cantidad de bebidas alcohólicas que ingiere  Lott médico querrá comenzar con cambios en el estilo de pallavi  Se puede agregar otro tratamiento si los cambios en el estilo de pallavi no son suficientes  Lott médico puede recomendarle que trabaje con un equipo para controlar la hiperlipidemia  El equipo puede incluir expertos médicos, nancy un dietista, un fisioterapeuta o un terapeuta del comportamiento   Los Moljoseph Tru[place] de lott katiana pueden participar en la creación de cambios en el estilo de pallavi  Los medicamentos pueden administrarse para bajar mallory colesterol LDL, triglicéridos o nivel de colesterol total  Puede necesitar medicamentos para bajar mallory colesterol si cualquiera de las siguientes afirmaciones es cierta:    Usted tiene antecedentes de derrame cerebral, accidente isquémico transitorio, angina de pecho inestable o ataque cardíaco     Mallory nivel de colesterol LDL es de 190 mg/dL o superior  Usted tiene de 36 a 76 años, tiene factores de riesgo de diabetes o enfermedad cardíaca y mallory colesterol LDL es de 70 mg/dL o superior  Los suplementos, incluyen aceite de pescado, arroz de [place]o [place] y [place]  El aceite de pescado puede ayudar a [employer] triglicéridos y los niveles de colesterol LDL  También puede aumentar mallory nivel de colesterol HDL  El arroz de Beto [place] puede ayudar a disminuir mallory nivel de colesterol total y el nivel de colesterol LDL  El ajo puede ayudar a disminuir el nivel de colesterol total  No tome ningún suplemento sin antes consultar con mallory médico     Cambios en la comida que puede hacer para ayudar a reducir king niveles de colesterol: Un dietista puede ayudarlo a crear un plan de alimentación saludable  Puede mostrarle cómo leer las etiquetas de los alimentos para elegir alimentos bajos en grasas saturadas, grasas trans y colesterol  Reduzca la cantidad total de grasa que usted consume  Elija karmen magras, leche descremada o con 1% de [place] y productos lácteos bajos en grasa, nancy yogur y [place]-barre  Trate de limitar o evitar las karmen rodriguez  Limite o no coma alimentos fritos o productos horneados, nancy galletas  Sustituya las grasas no saludables por grasa saludables  Cocine los alimentos en aceite de sanchez o aceite de canola  Elija margarinas suaves que karina bajas en grasas saturadas y grasas trans  Las semillas, nueces y aguacates son otros ejemplos de grasas saludables  Coma alimentos con grasas Omega 3   Arlys Rocky son [place], atún, caballa, nueces y [address] [place]  Coma pescado 2 veces por semana  Las [employer] no deberían comer pescado con niveles altos de tee, nancy tiburón, pez ninoska y pez caballa kevin  Aumente la cantidad de alimentos ricos en fibra que come  Los alimentos ricos en fibra pueden ayudar a disminuir el colesterol LDL  Lott objetivo debe ser [employer] 20 y 27 gramos de fibra por día  Rei Marchi y verduras son ricas en fibra  Consuma por lo menos 5 porciones al día  Otros alimentos ricos en fibra incluyen panes, pastas o cereales de grano entero o integrales, y arroz integral  Consuma 3 onzas de alimentos integrales al día  Aumente la fibra lentamente  Usted podría presentar malestar o inflamación estomacal y gases si añade fibra a lott dieta demasiado rápido  Coma alimentos proteicos saludables  Los ejemplos incluyen productos lácteos bajos en grasa, quiana y pavo sin piel, pescado y nueces  Limite los alimentos y las bebidas con un gran contenido de azúcar  Lott dietista o lott médico pueden ayudarlo a crear límites diarios para los alimentos y bebidas con alto contenido de azúcar  El límite puede ser más bajo si usted tiene diabetes u otra afección Mobile  Los límites también pueden ayudarlo a perder peso, de ser necesario  Cambios en lott estilo de pallavi que puede hacer para ayudar a reducir king niveles de colesterol:  Mantenga un peso saludable  Pregúntele a lott médico cuál es el peso ideal para usted  [place] que lo ayude a crear un plan para perder peso, si lo necesita  Bajar de peso puede reducir king niveles del colesterol total y triglicéridos  La pérdida de peso también puede ayudar a mantener lott presión arterial a un nivel saludable  Manténgase físicamente activo shreya todo el día  La actividad física, nancy el ejercicio, puede ayudar a bajar lott nivel de colesterol total y mantener un peso saludable  La actividad física también puede ayudar a aumentar lott nivel de colesterol HDL  Colabore con lott médico para desarrollar un programa que sea adecuado para usted  Jose Manuel al menos de 30 a 40 minutos de [place] física moderada la mayoría de los días de la [place]  Algunos ejemplos de ejercicios incluyen caminar enérgicamente, nadar o andar en bicicleta  Incluya también entrenamiento de fuerza al menos 2 veces por semana  Jennifer médicos pueden ayudarle a crear un plan de Erica Ehrich  No fume  La nicotina y otros químicos en los cigarrillos y cigarros pueden elevar jennifer niveles de colesterol  Pida información a lott médico si usted actualmente fuma y necesita ayuda para dejar de fumar  Los cigarrillos electrónicos o el tabaco sin humo igualmente contienen nicotina  Consulte con lott médico antes de [employer]  Limite o no consuma bebidas alcohólicas  El alcohol puede aumentar jennifer niveles de triglicéridos  Pregúntele a lott médico antes de beber alcohol  Pregunte cuánto puede beber en 24 horas o 1 semana  Acuda a la consulta de control con lott médico según las indicaciones: Anote jennifer preguntas para que se acuerde de hacerlas shreya jennifer visitas  © Copyright Xavier Calender 2022 Information is for End User's use only and may not be sold, redistributed or otherwise used for commercial purposes  Esta información es sólo para uso en educación  Lott intención no es darle un consejo médico sobre enfermedades o tratamientos  Colsulte con lott Dev Tayler farmacéutico antes de seguir cualquier régimen médico para saber si es seguro y efectivo para usted  Cholesterol and Your Health   AMBULATORY CARE:   Cholesterol  is a waxy, fat-like substance  Your body uses cholesterol to make hormones and new cells, and to protect nerves  Cholesterol is made by your body  It also comes from certain foods you eat, such as meat and dairy products  Your healthcare provider can help you set goals for your cholesterol levels  He or she can help you create a plan to meet your goals    Cholesterol level goals: Your cholesterol level goals depend on your risk for heart disease, your age, and your other health conditions  The following are general guidelines: Total cholesterol  includes low-density lipoprotein (LDL), high-density lipoprotein (HDL), and triglyceride levels  The total cholesterol level should be lower than 200 mg/dL and is best at about 150 mg/dL  LDL cholesterol  is called bad cholesterol  because it forms plaque in your arteries  As plaque builds up, your arteries become narrow, and less blood flows through  When plaque decreases blood flow to your heart, you may have chest pain  If plaque completely blocks an artery that brings blood to your heart, you may have a heart attack  Plaque can break off and form blood clots  Blood clots may block arteries in your brain and cause a stroke  The level should be less than 130 mg/dL and is best at about 100 mg/dL  HDL cholesterol  is called good cholesterol  because it helps remove LDL cholesterol from your arteries  It does this by attaching to LDL cholesterol and carrying it to your liver  Your liver breaks down LDL cholesterol so your body can get rid of it  High levels of HDL cholesterol can help prevent a heart attack and stroke  Low levels of HDL cholesterol can increase your risk for heart disease, heart attack, and stroke  The level should be 60 mg/dL or higher  Triglycerides  are a type of fat that store energy from foods you eat  High levels of triglycerides also cause plaque buildup  This can increase your risk for a heart attack or stroke  If your triglyceride level is high, your LDL cholesterol level may also be high  The level should be less than 150 mg/dL      Any of the following can increase your risk for high cholesterol:   Smoking cigarettes    Being overweight or obese, or not getting enough exercise    Drinking large amounts of alcohol    A medical condition such as hypertension (high blood pressure) or diabetes    Certain genes passed from your parents to you    Age older than 72 years    What you need to know about having your cholesterol levels checked: Adults 21to 39years of age should have their cholesterol levels checked every 4 to 6 years  Adults 45 years or older should have their cholesterol checked every 1 to 2 years  You may need your cholesterol checked more often, or at a younger age, if you have risk factors for heart disease  You may also need to have your cholesterol checked more often if you have other health conditions, such as diabetes  Blood tests are used to check cholesterol levels  Blood tests measure your levels of triglycerides, LDL cholesterol, and HDL cholesterol  How healthy fats affect your cholesterol levels:  Healthy fats, also called unsaturated fats, help lower LDL cholesterol and triglyceride levels  Healthy fats include the following:  Monounsaturated fats  are found in foods such as olive oil, canola oil, avocado, nuts, and olives  Polyunsaturated fats,  such as omega 3 fats, are found in fish, such as salmon, trout, and tuna  They can also be found in plant foods such as flaxseed, walnuts, and soybeans  How unhealthy fats affect your cholesterol levels:  Unhealthy fats increase LDL cholesterol and triglyceride levels  They are found in foods high in cholesterol, saturated fat, and trans fat:  Cholesterol  is found in eggs, dairy, and meat  Saturated fat  is found in butter, cheese, ice cream, whole milk, and coconut oil  Saturated fat is also found in meat, such as sausage, hot dogs, and bologna  Trans fat  is found in liquid oils and is used in fried and baked foods  Foods that contain trans fats include chips, crackers, muffins, sweet rolls, microwave popcorn, and cookies  Treatment  for high cholesterol will also decrease your risk of heart disease, heart attack, and stroke   Treatment may include any of the following:  Lifestyle changes  may include food, exercise, weight loss, and quitting smoking  You may also need to decrease the amount of alcohol you drink  Your healthcare provider will want you to start with lifestyle changes  Other treatment may be added if lifestyle changes are not enough  Your healthcare provider may recommend you work with a team to manage hyperlipidemia  The team may include medical experts such as a dietitian, an exercise or physical therapist, and a behavior therapist  Your family members may be included in helping you create lifestyle changes  Medicines  may be given to lower your LDL cholesterol, triglyceride levels, or total cholesterol level  You may need medicines to lower your cholesterol if any of the following is true:    You have a history of stroke, TIA, unstable angina, or a heart attack  Your LDL cholesterol level is 190 mg/dL or higher  You are age 36 to 76 years, have diabetes or heart disease risk factors, and your LDL cholesterol is 70 mg/dL or higher  Supplements  include fish oil, red yeast rice, and garlic  Fish oil may help lower your triglyceride and LDL cholesterol levels  It may also increase your HDL cholesterol level  Red yeast rice may help decrease your total cholesterol level and LDL cholesterol level  Garlic may help lower your total cholesterol level  Do not take any supplements without talking to your healthcare provider  Food changes you can make to lower your cholesterol levels:  A dietitian can help you create a healthy eating plan  He or she can show you how to read food labels and choose foods low in saturated fat, trans fats, and cholesterol  Decrease the total amount of fat you eat  Choose lean meats, fat-free or 1% fat milk, and low-fat dairy products, such as yogurt and cheese  Try to limit or avoid red meats  Limit or do not eat fried foods or baked goods, such as cookies  Replace unhealthy fats with healthy fats  Cook foods in olive oil or canola oil   Choose soft margarines that are low in saturated fat and trans fat  Seeds, nuts, and avocados are other examples of healthy fats  Eat foods with omega-3 fats  Examples include salmon, tuna, mackerel, walnuts, and flaxseed  Eat fish 2 times per week  Pregnant women should not eat fish that have high levels of mercury, such as shark, swordfish, and bill mackerel  Increase the amount of high-fiber foods you eat  High-fiber foods can help lower your LDL cholesterol  Aim to get between 20 and 30 grams of fiber each day  Fruits and vegetables are high in fiber  Eat at least 5 servings each day  Other high-fiber foods are whole-grain or whole-wheat breads, pastas, or cereals, and brown rice  Eat 3 ounces of whole-grain foods each day  Increase fiber slowly  You may have abdominal discomfort, bloating, and gas if you add fiber to your diet too quickly  Eat healthy protein foods  Examples include low-fat dairy products, skinless chicken and turkey, fish, and nuts  Limit foods and drinks that are high in sugar  Your dietitian or healthcare provider can help you create daily limits for high-sugar foods and drinks  The limit may be lower if you have diabetes or another health condition  Limits can also help you lose weight if needed  Lifestyle changes you can make to lower your cholesterol levels:   Maintain a healthy weight  Ask your healthcare provider what a healthy weight is for you  Ask him or her to help you create a weight loss plan if needed  Weight loss can decrease your total cholesterol and triglyceride levels  Weight loss may also help keep your blood pressure at a healthy level  Be physically active throughout the day  Physical activity, such as exercise, can help lower your total cholesterol level and maintain a healthy weight  Physical activity can also help increase your HDL cholesterol level  Work with your healthcare provider to create an program that is right for you   Get at least 30 to 40 minutes of moderate physical activity most days of the week  Examples of exercise include brisk walking, swimming, or biking  Also include strength training at least 2 times each week  Your healthcare providers can help you create a physical activity plan  Do not smoke  Nicotine and other chemicals in cigarettes and cigars can raise your cholesterol levels  Ask your healthcare provider for information if you currently smoke and need help to quit  E-cigarettes or smokeless tobacco still contain nicotine  Talk to your healthcare provider before you use these products  Limit or do not drink alcohol  Alcohol can increase your triglyceride levels  Ask your healthcare provider before you drink alcohol  Ask how much is okay for you to drink in 24 hours or 1 week  Follow up with your doctor as directed:  Write down your questions so you remember to ask them during your visits  © Copyright [employer] 2022 Information is for End User's use only and may not be sold, redistributed or otherwise used for commercial purposes  The above information is an  only  It is not intended as medical advice for individual conditions or treatments  Talk to your doctor, nurse or pharmacist before following any medical regimen to see if it is safe and effective for you

## 2023-03-15 NOTE — PROGRESS NOTES
ADULT ANNUAL PHYSICAL  [address] [facility] PRIMARY CARE    NAME: Luis Miguel Jauregui  AGE: 38 y o  SEX: female  : 1980     DATE: 3/16/2023     Assessment and Plan:     Problem List Items Addressed This Visit        Other    Iron deficiency anemia secondary to inadequate dietary iron intake    Menorrhagia with regular cycle    Relevant Orders    Ambulatory Referral to Gynecology    Annual physical exam - Primary     Annual physical completed  Reviewed blood work  Patient is iron deficient  Patient reports that she received iron transfusions in [facility] sometimes 4 times a year sometimes several times a year  Patient has heavy menses  Iron infusion ordered  Referral made to GYN to discuss options for management of menorrhagia  Trazodone ordered for insomnia  Discussed good sleep hygiene  Mammogram is scheduled  Discussed self-care  Visit done with help of  via video  Other Visit Diagnoses     Primary insomnia        Relevant Medications    traZODone (DESYREL) 50 mg tablet          Immunizations and preventive care screenings were discussed with patient today  Appropriate education was printed on patient's after visit summary  Counseling:  Alcohol/drug use: discussed moderation in alcohol intake, the recommendations for healthy alcohol use, and avoidance of illicit drug use  Dental Health: discussed importance of regular tooth brushing, flossing, and dental visits  Injury prevention: discussed safety/seat belts, safety helmets, smoke detectors, carbon dioxide detectors, and smoking near bedding or upholstery  Sexual health: discussed sexually transmitted diseases, partner selection, use of condoms, avoidance of unintended pregnancy, and contraceptive alternatives  Exercise: the importance of regular exercise/physical activity was discussed  Recommend exercise 3-5 times per week for at least 30 minutes            No follow-ups on file      Chief Complaint:     Chief Complaint   Patient presents with   • Follow-up   • Insomnia   • Palpitations      History of Present Illness:     Adult Annual Physical   Patient here for a comprehensive physical exam  The patient reports problems - TACHY   Diet and Physical Activity  Diet/Nutrition: well balanced diet  Exercise: no formal exercise  Depression Screening  PHQ-2/9 Depression Screening         General Health  Sleep: sleeps poorly  Hearing: normal - bilateral   Vision: no vision problems  Dental: regular dental visits and brushes teeth twice daily  /GYN Health  Patient is: perimenopausal  Last menstrual period: current  Contraceptive method: barrier methods  Review of Systems:     Review of Systems   Respiratory: Positive for shortness of breath  Cardiovascular: Positive for palpitations  Genitourinary: Positive for menstrual problem and vaginal bleeding  Neurological: Positive for weakness  Past Medical History:     History reviewed  No pertinent past medical history     Past Surgical History:     Past Surgical History:   Procedure Laterality Date   • ABDOMINAL SURGERY     • GASTRIC BYPASS        Social History:     Social History     Socioeconomic History   • Marital status: /Civil Union     Spouse name: None   • Number of children: None   • Years of education: None   • Highest education level: None   Occupational History   • None   Tobacco Use   • Smoking status: Never   • Smokeless tobacco: Never   Substance and Sexual Activity   • Alcohol use: No   • Drug use: No   • Sexual activity: None   Other Topics Concern   • None   Social History Narrative   • None     Social Determinants of Health     Financial Resource Strain: Not on file   Food Insecurity: Not on file   Transportation Needs: Not on file   Physical Activity: Not on file   Stress: Not on file   Social Connections: Not on file   Intimate Partner Violence: Not on file   Housing Stability: Not on file      Family History:     History reviewed  No pertinent family history  Current Medications:     Current Outpatient Medications   Medication Sig Dispense Refill   • multivitamin (THERAGRAN) TABS Take 1 tablet by mouth daily     • traZODone (DESYREL) 50 mg tablet Take 1 tablet (50 mg total) by mouth daily at bedtime 30 tablet 3     No current facility-administered medications for this visit  Allergies:     No Known Allergies   Physical Exam:     /68   Pulse 80   Temp 97 5 °F (36 4 °C) (Temporal)   Resp 18   Ht 5' 4 17" (1 63 m)   Wt 86 2 kg (190 lb)   SpO2 97%   BMI 32 44 kg/m²     Physical Exam  Constitutional:       General: She is not in acute distress  Appearance: Normal appearance  She is obese  She is not ill-appearing  HENT:      Head: Normocephalic and atraumatic  Nose: Nose normal       Mouth/Throat:      Mouth: Mucous membranes are moist    Eyes:      Pupils: Pupils are equal, round, and reactive to light  Cardiovascular:      Rate and Rhythm: Regular rhythm  Tachycardia present  Pulses: Normal pulses  Heart sounds: Normal heart sounds  Pulmonary:      Effort: Pulmonary effort is normal  No respiratory distress  Breath sounds: Normal breath sounds  Chest:      Chest wall: No tenderness  Abdominal:      General: Abdomen is flat  Bowel sounds are normal  There is no distension  Palpations: There is no mass  Tenderness: There is no abdominal tenderness  Musculoskeletal:         General: Normal range of motion  Cervical back: Normal range of motion and neck supple  Skin:     General: Skin is warm and dry  Neurological:      General: No focal deficit present  Mental Status: She is alert and oriented to person, place, and time  Psychiatric:         Mood and Affect: Mood normal          Behavior: Behavior normal          Thought Content:  Thought content normal          Judgment: Judgment normal           [employer], [address] [place] [address] [facility]

## 2023-03-16 PROBLEM — Z00.00 ANNUAL PHYSICAL EXAM: Status: ACTIVE | Noted: 2023-03-16

## 2023-03-16 NOTE — ASSESSMENT & PLAN NOTE
Annual physical completed  Reviewed blood work  Patient is iron deficient  Patient reports that she received iron transfusions in [facility] sometimes 4 times a year sometimes several times a year  Patient has heavy menses  Iron infusion ordered  Referral made to GYN to discuss options for management of menorrhagia  Trazodone ordered for insomnia  Discussed good sleep hygiene  Mammogram is scheduled  Discussed self-care  Visit done with help of  via video

## 2023-03-22 ENCOUNTER — TELEPHONE (OUTPATIENT)
Dept: HEMATOLOGY ONCOLOGY | Facility: CLINIC | Age: 43
End: 2023-03-22

## 2023-03-22 NOTE — TELEPHONE ENCOUNTER
Scheduling Appointment SEND TO \[facility]\""    Person calling In      If other than patient calling, are they listed on the communication consent form? yes   Doctor Arby [place]   Location [facility]   Appointment date and time 4/20 9am   Reason for scheduling appointment Anemia PATIENT WILL NEED    Patient verbalized understanding?  yes   No show policy reviewed with patient yes

## 2023-04-25 ENCOUNTER — HOSPITAL ENCOUNTER (OUTPATIENT)
Dept: INFUSION CENTER | Facility: CLINIC | Age: 43
Discharge: HOME | End: 2023-04-25

## 2023-04-25 VITALS
HEART RATE: 93 BPM | SYSTOLIC BLOOD PRESSURE: 110 MMHG | RESPIRATION RATE: 18 BRPM | TEMPERATURE: 97 F | DIASTOLIC BLOOD PRESSURE: 52 MMHG

## 2023-04-25 DIAGNOSIS — D50.8 IRON DEFICIENCY ANEMIA SECONDARY TO INADEQUATE DIETARY IRON INTAKE: Primary | ICD-10-CM

## 2023-04-25 DIAGNOSIS — N92.0 MENORRHAGIA WITH REGULAR CYCLE: ICD-10-CM

## 2023-04-25 RX ORDER — ACETAMINOPHEN 160 MG/5ML
650 SUSPENSION, ORAL (FINAL DOSE FORM) ORAL EVERY 4 HOURS PRN
Status: CANCELLED
Start: 2023-05-02

## 2023-04-25 RX ORDER — SODIUM CHLORIDE 9 MG/ML
20 INJECTION, SOLUTION INTRAVENOUS ONCE
Status: CANCELLED | OUTPATIENT
Start: 2023-05-02

## 2023-04-25 RX ORDER — DIPHENHYDRAMINE HCL 25 MG
25 TABLET ORAL ONCE
Status: COMPLETED | OUTPATIENT
Start: 2023-04-25 | End: 2023-04-25

## 2023-04-25 RX ORDER — PREDNISONE 1 MG/1
5 TABLET ORAL ONCE
Status: COMPLETED | OUTPATIENT
Start: 2023-04-25 | End: 2023-04-25

## 2023-04-25 RX ORDER — DIPHENHYDRAMINE HCL 25 MG
25 TABLET ORAL ONCE
Status: CANCELLED
Start: 2023-05-02 | End: 2023-05-02

## 2023-04-25 RX ORDER — SODIUM CHLORIDE 9 MG/ML
20 INJECTION, SOLUTION INTRAVENOUS ONCE
Status: COMPLETED | OUTPATIENT
Start: 2023-04-25 | End: 2023-04-25

## 2023-04-25 RX ORDER — PREDNISONE 1 MG/1
5 TABLET ORAL ONCE
Status: CANCELLED
Start: 2023-05-02 | End: 2023-05-02

## 2023-04-25 RX ORDER — ACETAMINOPHEN 160 MG/5ML
650 SUSPENSION, ORAL (FINAL DOSE FORM) ORAL EVERY 4 HOURS PRN
Status: DISCONTINUED | OUTPATIENT
Start: 2023-04-25 | End: 2023-04-28 | Stop reason: HOSPADM

## 2023-04-25 RX ADMIN — IRON SUCROSE 300 MG: 20 INJECTION, SOLUTION INTRAVENOUS at 09:07

## 2023-04-25 RX ADMIN — DIPHENHYDRAMINE HYDROCHLORIDE 25 MG: 25 TABLET ORAL at 09:02

## 2023-04-25 RX ADMIN — PREDNISONE 5 MG: 10 TABLET ORAL at 09:02

## 2023-04-25 RX ADMIN — SODIUM CHLORIDE 20 ML/HR: 0.9 INJECTION, SOLUTION INTRAVENOUS at 08:58

## 2023-04-25 NOTE — PROGRESS NOTES
Pt to clinic for venofer  Offers no complaints at this time  Tolerated infusion without complications  PIV removed  Aware of next appointment  AVS declined

## 2023-05-02 ENCOUNTER — HOSPITAL ENCOUNTER (OUTPATIENT)
Dept: INFUSION CENTER | Facility: CLINIC | Age: 43
Discharge: HOME | End: 2023-05-02

## 2023-05-02 VITALS
DIASTOLIC BLOOD PRESSURE: 69 MMHG | SYSTOLIC BLOOD PRESSURE: 126 MMHG | RESPIRATION RATE: 17 BRPM | HEART RATE: 94 BPM | TEMPERATURE: 97 F

## 2023-05-02 DIAGNOSIS — N92.0 MENORRHAGIA WITH REGULAR CYCLE: ICD-10-CM

## 2023-05-02 DIAGNOSIS — D50.8 IRON DEFICIENCY ANEMIA SECONDARY TO INADEQUATE DIETARY IRON INTAKE: Primary | ICD-10-CM

## 2023-05-02 RX ORDER — DIPHENHYDRAMINE HCL 25 MG
25 TABLET ORAL ONCE
Start: 2023-05-09 | End: 2023-05-09

## 2023-05-02 RX ORDER — ACETAMINOPHEN 160 MG/5ML
650 SUSPENSION, ORAL (FINAL DOSE FORM) ORAL EVERY 4 HOURS PRN
Status: DISCONTINUED | OUTPATIENT
Start: 2023-05-02 | End: 2023-05-05 | Stop reason: HOSPADM

## 2023-05-02 RX ORDER — SODIUM CHLORIDE 9 MG/ML
20 INJECTION, SOLUTION INTRAVENOUS ONCE
Status: COMPLETED | OUTPATIENT
Start: 2023-05-02 | End: 2023-05-02

## 2023-05-02 RX ORDER — PREDNISONE 1 MG/1
5 TABLET ORAL ONCE
Status: DISCONTINUED | OUTPATIENT
Start: 2023-05-02 | End: 2023-05-02

## 2023-05-02 RX ORDER — PREDNISONE 1 MG/1
5 TABLET ORAL ONCE
Start: 2023-05-09 | End: 2023-05-09

## 2023-05-02 RX ORDER — DIPHENHYDRAMINE HCL 25 MG
25 TABLET ORAL ONCE
Status: COMPLETED | OUTPATIENT
Start: 2023-05-02 | End: 2023-05-02

## 2023-05-02 RX ORDER — SODIUM CHLORIDE 9 MG/ML
20 INJECTION, SOLUTION INTRAVENOUS ONCE
OUTPATIENT
Start: 2023-05-09

## 2023-05-02 RX ORDER — ACETAMINOPHEN 160 MG/5ML
650 SUSPENSION, ORAL (FINAL DOSE FORM) ORAL EVERY 4 HOURS PRN
Start: 2023-05-09

## 2023-05-02 RX ORDER — PREDNISONE 10 MG/1
5 TABLET ORAL ONCE
Status: COMPLETED | OUTPATIENT
Start: 2023-05-02 | End: 2023-05-02

## 2023-05-02 RX ADMIN — ACETAMINOPHEN 650 MG: 650 SUSPENSION ORAL at 10:37

## 2023-05-02 RX ADMIN — DIPHENHYDRAMINE HYDROCHLORIDE 25 MG: 25 TABLET ORAL at 10:37

## 2023-05-02 RX ADMIN — SODIUM CHLORIDE 300 MG: 0.9 INJECTION, SOLUTION INTRAVENOUS at 10:39

## 2023-05-02 RX ADMIN — PREDNISONE 5 MG: 10 TABLET ORAL at 10:37

## 2023-05-02 RX ADMIN — SODIUM CHLORIDE 20 ML/HR: 0.9 INJECTION, SOLUTION INTRAVENOUS at 10:45

## 2023-05-02 NOTE — PROGRESS NOTES
Pt to clinic for venofer   Pt offers no complaints today  Tolerated infusion without complications  Pt aware of next appointment  PIV removed  AVS was offered, pt declined  Pt ambulated out of clinic safely

## 2023-05-09 ENCOUNTER — HOSPITAL ENCOUNTER (OUTPATIENT)
Dept: INFUSION CENTER | Facility: CLINIC | Age: 43
Discharge: HOME | End: 2023-05-09

## 2023-05-09 VITALS
HEART RATE: 92 BPM | RESPIRATION RATE: 17 BRPM | SYSTOLIC BLOOD PRESSURE: 115 MMHG | TEMPERATURE: 97.4 F | DIASTOLIC BLOOD PRESSURE: 54 MMHG

## 2023-05-09 DIAGNOSIS — D50.8 IRON DEFICIENCY ANEMIA SECONDARY TO INADEQUATE DIETARY IRON INTAKE: Primary | ICD-10-CM

## 2023-05-09 DIAGNOSIS — N92.0 MENORRHAGIA WITH REGULAR CYCLE: ICD-10-CM

## 2023-05-09 RX ORDER — PREDNISONE 1 MG/1
5 TABLET ORAL ONCE
Status: CANCELLED
Start: 2023-05-16 | End: 2023-05-16

## 2023-05-09 RX ORDER — PREDNISONE 10 MG/1
5 TABLET ORAL ONCE
Status: COMPLETED | OUTPATIENT
Start: 2023-05-09 | End: 2023-05-09

## 2023-05-09 RX ORDER — DIPHENHYDRAMINE HCL 25 MG
25 TABLET ORAL ONCE
Status: COMPLETED | OUTPATIENT
Start: 2023-05-09 | End: 2023-05-09

## 2023-05-09 RX ORDER — SODIUM CHLORIDE 9 MG/ML
20 INJECTION, SOLUTION INTRAVENOUS ONCE
Status: COMPLETED | OUTPATIENT
Start: 2023-05-09 | End: 2023-05-09

## 2023-05-09 RX ORDER — ACETAMINOPHEN 160 MG/5ML
650 SUSPENSION, ORAL (FINAL DOSE FORM) ORAL EVERY 4 HOURS PRN
Status: DISCONTINUED | OUTPATIENT
Start: 2023-05-09 | End: 2023-05-12 | Stop reason: HOSPADM

## 2023-05-09 RX ORDER — PREDNISONE 1 MG/1
5 TABLET ORAL ONCE
Status: DISCONTINUED | OUTPATIENT
Start: 2023-05-09 | End: 2023-05-09

## 2023-05-09 RX ORDER — ACETAMINOPHEN 160 MG/5ML
650 SUSPENSION, ORAL (FINAL DOSE FORM) ORAL EVERY 4 HOURS PRN
Status: CANCELLED
Start: 2023-05-16

## 2023-05-09 RX ORDER — SODIUM CHLORIDE 9 MG/ML
20 INJECTION, SOLUTION INTRAVENOUS ONCE
Status: CANCELLED | OUTPATIENT
Start: 2023-05-16

## 2023-05-09 RX ORDER — DIPHENHYDRAMINE HCL 25 MG
25 TABLET ORAL ONCE
Status: CANCELLED
Start: 2023-05-16 | End: 2023-05-16

## 2023-05-09 RX ADMIN — IRON SUCROSE 300 MG: 20 INJECTION, SOLUTION INTRAVENOUS at 08:33

## 2023-05-09 RX ADMIN — ACETAMINOPHEN 650 MG: 650 SUSPENSION ORAL at 08:33

## 2023-05-09 RX ADMIN — DIPHENHYDRAMINE HYDROCHLORIDE 25 MG: 25 TABLET ORAL at 08:33

## 2023-05-09 RX ADMIN — PREDNISONE 5 MG: 10 TABLET ORAL at 08:32

## 2023-05-09 RX ADMIN — SODIUM CHLORIDE 20 ML/HR: 0.9 INJECTION, SOLUTION INTRAVENOUS at 08:33

## 2023-05-16 ENCOUNTER — HOSPITAL ENCOUNTER (OUTPATIENT)
Dept: INFUSION CENTER | Facility: CLINIC | Age: 43
Discharge: HOME | End: 2023-05-16

## 2023-05-16 VITALS
RESPIRATION RATE: 16 BRPM | DIASTOLIC BLOOD PRESSURE: 73 MMHG | SYSTOLIC BLOOD PRESSURE: 130 MMHG | HEART RATE: 88 BPM | TEMPERATURE: 97.5 F

## 2023-05-16 DIAGNOSIS — D50.8 IRON DEFICIENCY ANEMIA SECONDARY TO INADEQUATE DIETARY IRON INTAKE: Primary | ICD-10-CM

## 2023-05-16 DIAGNOSIS — N92.0 MENORRHAGIA WITH REGULAR CYCLE: ICD-10-CM

## 2023-05-16 RX ORDER — ACETAMINOPHEN 160 MG/5ML
650 SUSPENSION, ORAL (FINAL DOSE FORM) ORAL EVERY 4 HOURS PRN
Status: DISCONTINUED | OUTPATIENT
Start: 2023-05-16 | End: 2023-05-19 | Stop reason: HOSPADM

## 2023-05-16 RX ORDER — DIPHENHYDRAMINE HCL 25 MG
25 TABLET ORAL ONCE
Status: COMPLETED | OUTPATIENT
Start: 2023-05-16 | End: 2023-05-16

## 2023-05-16 RX ORDER — PREDNISONE 1 MG/1
5 TABLET ORAL ONCE
Start: 2023-05-23 | End: 2023-05-23

## 2023-05-16 RX ORDER — ACETAMINOPHEN 160 MG/5ML
650 SUSPENSION, ORAL (FINAL DOSE FORM) ORAL EVERY 4 HOURS PRN
Start: 2023-05-23

## 2023-05-16 RX ORDER — PREDNISONE 1 MG/1
5 TABLET ORAL ONCE
Status: DISCONTINUED | OUTPATIENT
Start: 2023-05-16 | End: 2023-05-16

## 2023-05-16 RX ORDER — SODIUM CHLORIDE 9 MG/ML
20 INJECTION, SOLUTION INTRAVENOUS ONCE
Status: CANCELLED | OUTPATIENT
Start: 2023-05-23

## 2023-05-16 RX ORDER — DIPHENHYDRAMINE HCL 25 MG
25 TABLET ORAL ONCE
Start: 2023-05-23 | End: 2023-05-23

## 2023-05-16 RX ORDER — PREDNISONE 10 MG/1
5 TABLET ORAL ONCE
Status: COMPLETED | OUTPATIENT
Start: 2023-05-16 | End: 2023-05-16

## 2023-05-16 RX ORDER — SODIUM CHLORIDE 9 MG/ML
20 INJECTION, SOLUTION INTRAVENOUS ONCE
Status: COMPLETED | OUTPATIENT
Start: 2023-05-16 | End: 2023-05-16

## 2023-05-16 RX ADMIN — ACETAMINOPHEN 650 MG: 650 SUSPENSION ORAL at 10:31

## 2023-05-16 RX ADMIN — PREDNISONE 5 MG: 10 TABLET ORAL at 10:31

## 2023-05-16 RX ADMIN — SODIUM CHLORIDE 20 ML/HR: 9 INJECTION, SOLUTION INTRAVENOUS at 10:33

## 2023-05-16 RX ADMIN — IRON SUCROSE 300 MG: 20 INJECTION, SOLUTION INTRAVENOUS at 10:34

## 2023-05-16 RX ADMIN — DIPHENHYDRAMINE HYDROCHLORIDE 25 MG: 25 TABLET ORAL at 10:31

## 2023-05-16 NOTE — PROGRESS NOTES
Pt presents for venofer infusion offering no complaints  Pre medications given prior to treatment  Pt tolerated treatment without incident  PIV removed  AVS declined, therapy plan complete

## 2023-07-27 ENCOUNTER — VBI (OUTPATIENT)
Dept: ADMINISTRATIVE | Facility: OTHER | Age: 43
End: 2023-07-27

## 2023-08-24 ENCOUNTER — TELEPHONE (OUTPATIENT)
Dept: HEMATOLOGY ONCOLOGY | Facility: CLINIC | Age: 43
End: 2023-08-24

## 2023-08-24 NOTE — TELEPHONE ENCOUNTER
EVARISTO  DR kem RODRIGUEZ   Who are you speaking with? Patient   If it is not the patient, are they listed on an active communication consent form? N/A   Is this a EVARISTO or DR kem RODRIGUEZ EVARISTO   Which provider is patient currently scheduled or established with? Hannah Villalta   What is the original appointment date and time? 10/18/23 11:00AM   At which location is the appointment scheduled to take place? [facility]   Which provider is the patient transitioning care to? Dr. Sebas Reddy   What is the new appointment date and time? 10/18/23 11:00AM   At which location is the new appointment scheduled to take place? [facility]   What is the reason for this change?  Provider

## 2023-10-13 ENCOUNTER — TELEPHONE (OUTPATIENT)
Dept: HEMATOLOGY ONCOLOGY | Facility: CLINIC | Age: 43
End: 2023-10-13

## 2023-10-13 ENCOUNTER — APPOINTMENT (OUTPATIENT)
Dept: LAB | Facility: HOSPITAL | Age: 43
End: 2023-10-13
Payer: COMMERCIAL

## 2023-10-18 ENCOUNTER — OFFICE VISIT (OUTPATIENT)
Dept: HEMATOLOGY ONCOLOGY | Facility: CLINIC | Age: 43
End: 2023-10-18
Payer: COMMERCIAL

## 2023-10-18 ENCOUNTER — TELEPHONE (OUTPATIENT)
Dept: HEMATOLOGY ONCOLOGY | Facility: CLINIC | Age: 43
End: 2023-10-18

## 2023-10-18 VITALS
OXYGEN SATURATION: 97 % | TEMPERATURE: 98 F | RESPIRATION RATE: 16 BRPM | DIASTOLIC BLOOD PRESSURE: 78 MMHG | WEIGHT: 187 LBS | SYSTOLIC BLOOD PRESSURE: 128 MMHG | HEART RATE: 68 BPM | BODY MASS INDEX: 31.92 KG/M2 | HEIGHT: 64 IN

## 2023-10-18 DIAGNOSIS — D50.8 IRON DEFICIENCY ANEMIA SECONDARY TO INADEQUATE DIETARY IRON INTAKE: ICD-10-CM

## 2023-10-18 DIAGNOSIS — D50.8 OTHER IRON DEFICIENCY ANEMIA: Primary | ICD-10-CM

## 2023-10-18 DIAGNOSIS — N92.0 MENORRHAGIA WITH REGULAR CYCLE: ICD-10-CM

## 2023-10-18 PROCEDURE — 99215 OFFICE O/P EST HI 40 MIN: CPT | Performed by: INTERNAL MEDICINE

## 2023-10-18 RX ORDER — SODIUM CHLORIDE 9 MG/ML
20 INJECTION, SOLUTION INTRAVENOUS ONCE
OUTPATIENT
Start: 2023-11-07

## 2023-10-18 RX ORDER — TRAMADOL HYDROCHLORIDE 50 MG/1
TABLET ORAL
COMMUNITY
Start: 2023-09-18

## 2023-10-18 RX ORDER — DIPHENHYDRAMINE HCL 25 MG
25 TABLET ORAL ONCE
OUTPATIENT
Start: 2023-11-07 | End: 2023-10-18

## 2023-10-18 NOTE — PROGRESS NOTES
Joe BRUNNER  [facility]AT[facility] [address]  1980      PRIMARY HEMATOLOGIC/ONCOLOGIC DIAGNOSIS:  JAMES  Gastric bypass  Menorrhagia with irregular cycles    INTERIM HISTORY:  The patient present for a follow-up for the above. She feels SOB and has felt some palpitations. PAST MEDICAL,PAST SURGICAL, FAMILY AND SOCIAL HISTORY:    Patient Active Problem List   Diagnosis    History of gastric bypass    Iron deficiency anemia secondary to inadequate dietary iron intake    Endometriosis    Menorrhagia with regular cycle    Encounter to establish care    Encounter for screening mammogram for breast cancer    Medication management    Annual physical exam     No past medical history on file. Past Surgical History:   Procedure Laterality Date    ABDOMINAL SURGERY      GASTRIC BYPASS       No family history on file.   Social History     Socioeconomic History    Marital status: /Civil Union     Spouse name: Not on file    Number of children: Not on file    Years of education: Not on file    Highest education level: Not on file   Occupational History    Not on file   Tobacco Use    Smoking status: Never    Smokeless tobacco: Never   Substance and Sexual Activity    Alcohol use: No    Drug use: No    Sexual activity: Not on file   Other Topics Concern    Not on file   Social History Narrative    Not on file     Social Determinants of Health     Financial Resource Strain: Not on file   Food Insecurity: Not on file   Transportation Needs: Not on file   Physical Activity: Not on file   Stress: Not on file   Social Connections: Not on file   Intimate Partner Violence: Not on file   Housing Stability: Not on file       Current Outpatient Medications:     cyclobenzaprine (FEXMID) 7.5 MG tablet, 3 (three) times a day, Disp: , Rfl:     Diclofenac Sodium 1.5 % SOLN, diclofenac 1.5 % topical drops  APPLY 40 DROPS TO THE AFFECTED AREA BY TOPICAL ROUTE 4 TIMES PER DAY, Disp: , Rfl:     Lidocaine-Menthol 4-1 % PTCH, every 12 (twelve) hours, Disp: , Rfl:     multivitamin (THERAGRAN) TABS, Take 1 tablet by mouth daily, Disp: , Rfl:     traMADol (ULTRAM) 50 mg tablet, Take 1 tablet twice a day by oral route as directed for 30 days. , Disp: , Rfl:     traZODone (DESYREL) 50 mg tablet, Take 1 tablet (50 mg total) by mouth daily at bedtime, Disp: 30 tablet, Rfl: 3  No Known Allergies  Vitals:    10/18/23 1103   BP: 128/78   Pulse: 68   Resp: 16   Temp: 98 °F (36.7 °C)   SpO2: 97%       ROS:  CONSTITUTIONAL:  No fever. No chills. No dizziness. No weakness. EYES:  No pain, erythema, or discharge. No blurring of vision. ENT:  No sore throat, URI symptoms. No epistaxis. No tinnitus. CARDIOVASCULAR:  No chest pain. No palpitations. No lower extremity edema. RESPIRATORY:  No shortness of breath, cough, pain with respiration, pleuritic chest pain. No hemoptysis. No dyspnea. No paroxysmal nocturnal dyspnea. GASTROINTESTINAL:  Normal appetite. No nausea, vomiting, diarrhea. No pain. No bloating. No melena. GENITOURINARY:  No frequency, urgency, nocturia. No hematuria or dysuria. MUSCULOSKELETAL:  No arthralgias or myalgias. INTEGUMENTARY:  No swelling. No bruising. No contusions. No abrasions. No lymphangitis. NEUROLOGIC:  No headache. No neck pain. No numbness or tingling of the extremities. No weakness. PSYCHIATRIC:  No confusion. ENDOCRINE:  No fatigue. No weakness. No history of thyroid, diabetes or adrenal problems. HEMATOLOGICAL:  No bleeding. No petechiae. No bruising.     PHYSICAL EXAM:    GENERAL: AAO x 3  HEENT: AT,NC  CVS: S1S2 RRR  LUNGS: CTA b/l  ABD: NT,ND, +BS  EXTR: no edema  NEURO: CN II-XII grossly intact    LABS:  I have reviewed pertinent labs:  CBC:   Lab Results   Component Value Date    WBC 5.86 10/13/2023    RBC 4.01 10/13/2023    HGB 11.2 (L) 10/13/2023    HCT 34.3 (L) 10/13/2023    MCV 86 10/13/2023     10/13/2023 MCH 27.9 10/13/2023    MCHC 32.7 10/13/2023    RDW 13.2 10/13/2023    MPV 10.8 10/13/2023    NEUTROABS 3.63 10/13/2023     CMP:   Lab Results   Component Value Date    SODIUM 134 (L) 03/07/2023    K 4.0 03/07/2023     03/07/2023    CO2 25 03/07/2023    AGAP 2 (L) 03/07/2023    BUN 11 03/07/2023    CREATININE 0.60 03/07/2023    GLUC 168 (H) 10/01/2019    GLUF 117 (H) 03/07/2023    CALCIUM 9.5 03/07/2023    AST 15 03/07/2023    ALT 11 (L) 03/07/2023    ALKPHOS 71 03/07/2023    TP 7.4 03/07/2023    ALB 3.9 03/07/2023    TBILI 0.23 03/07/2023    EGFR 111 03/07/2023     Liver Enzymes:   Lab Results   Component Value Date    AST 15 03/07/2023    ALT 11 (L) 03/07/2023    ALKPHOS 71 03/07/2023    TP 7.4 03/07/2023    ALB 3.9 03/07/2023    TBILI 0.23 03/07/2023     Vitamin B12 No results found for: "QNAHXDQF22"  Iron Study   Lab Results   Component Value Date    FERRITIN 6 (L) 10/13/2023    CONCFE 5 (L) 10/13/2023    TIBC 393 10/13/2023    IRON 18 (L) 10/13/2023     Folate No results found for: "FOLATE"  Magnesium No results found for: "MG"  Phosphorus No results found for: "PHOS"  Coagulation Panel No results found for: "DDIMER", "PROTIME", "INR", "PTT"    IMAGING:  No results found. I reviewed the above laboratory and imaging data. ASSESSMENT/PLAN:  JAMES in the setting of Gastric bypass and menorrhagia with irregular cycles. Reports undergoing colonoscopy about a year ago -- WNL. Referred to GYN. Iron studies c/w JAMES. She had reaction ( SOB and tachycardia) to the Venofer that she received in May. Will switch to Ferrlecit. She reports that there is no likelihood for her to be pregnant-- LMP 10/2/23.

## 2023-10-18 NOTE — TELEPHONE ENCOUNTER
Called patient and left  regarding scheduling a 2 mo follow up with Dr Cristian Shannon.  Left hope line number for patient to call and schedule

## 2023-10-23 ENCOUNTER — TELEPHONE (OUTPATIENT)
Dept: HEMATOLOGY ONCOLOGY | Facility: CLINIC | Age: 43
End: 2023-10-23

## 2023-10-23 NOTE — TELEPHONE ENCOUNTER
Patient Call    Who are you speaking with? Spouse    If it is not the patient, are they listed on an active communication consent form? Yes   What is the reason for this call? Schedule infusion plan, need English to [facility] and [place]    Does this require a call back? Yes   If a call back is required, please list best call back number [phone]    If a call back is required, advise that a message will be forwarded to their care team and someone will return their call as soon as possible. Did you relay this information to the patient?  Yes

## 2023-11-07 ENCOUNTER — HOSPITAL ENCOUNTER (OUTPATIENT)
Dept: INFUSION CENTER | Facility: CLINIC | Age: 43
Discharge: HOME | End: 2023-11-07
Payer: COMMERCIAL

## 2023-11-07 VITALS
SYSTOLIC BLOOD PRESSURE: 119 MMHG | DIASTOLIC BLOOD PRESSURE: 60 MMHG | RESPIRATION RATE: 18 BRPM | HEART RATE: 94 BPM | TEMPERATURE: 97.3 F

## 2023-11-07 DIAGNOSIS — D50.8 IRON DEFICIENCY ANEMIA SECONDARY TO INADEQUATE DIETARY IRON INTAKE: ICD-10-CM

## 2023-11-07 DIAGNOSIS — N92.0 MENORRHAGIA WITH REGULAR CYCLE: Primary | ICD-10-CM

## 2023-11-07 PROCEDURE — 96365 THER/PROPH/DIAG IV INF INIT: CPT

## 2023-11-07 RX ORDER — DIPHENHYDRAMINE HCL 25 MG
25 TABLET ORAL ONCE
Status: CANCELLED | OUTPATIENT
Start: 2023-11-14 | End: 2023-11-14

## 2023-11-07 RX ORDER — SODIUM CHLORIDE 9 MG/ML
20 INJECTION, SOLUTION INTRAVENOUS ONCE
Status: CANCELLED | OUTPATIENT
Start: 2023-11-14

## 2023-11-07 RX ORDER — SODIUM CHLORIDE 9 MG/ML
20 INJECTION, SOLUTION INTRAVENOUS ONCE
Status: COMPLETED | OUTPATIENT
Start: 2023-11-07 | End: 2023-11-07

## 2023-11-07 RX ORDER — DIPHENHYDRAMINE HCL 25 MG
25 TABLET ORAL ONCE
Status: COMPLETED | OUTPATIENT
Start: 2023-11-07 | End: 2023-11-07

## 2023-11-07 RX ADMIN — SODIUM CHLORIDE 20 ML/HR: 0.9 INJECTION, SOLUTION INTRAVENOUS at 10:17

## 2023-11-07 RX ADMIN — SODIUM CHLORIDE 125 MG: 9 INJECTION, SOLUTION INTRAVENOUS at 10:49

## 2023-11-07 RX ADMIN — DIPHENHYDRAMINE HYDROCHLORIDE 25 MG: 25 TABLET ORAL at 10:17

## 2023-11-07 NOTE — PROGRESS NOTES
Pt presents for iv ferrlicet, offers no complaints, tolerated infusion, AVS provided, d/c from unit stable

## 2023-11-08 ENCOUNTER — HOSPITAL ENCOUNTER (OUTPATIENT)
Dept: INFUSION CENTER | Facility: CLINIC | Age: 43
Discharge: HOME | End: 2023-11-08

## 2023-11-14 ENCOUNTER — HOSPITAL ENCOUNTER (OUTPATIENT)
Dept: INFUSION CENTER | Facility: CLINIC | Age: 43
Discharge: HOME | End: 2023-11-14
Payer: COMMERCIAL

## 2023-11-14 VITALS
RESPIRATION RATE: 18 BRPM | SYSTOLIC BLOOD PRESSURE: 117 MMHG | TEMPERATURE: 96.7 F | HEART RATE: 102 BPM | DIASTOLIC BLOOD PRESSURE: 66 MMHG

## 2023-11-14 DIAGNOSIS — N92.0 MENORRHAGIA WITH REGULAR CYCLE: Primary | ICD-10-CM

## 2023-11-14 DIAGNOSIS — D50.8 IRON DEFICIENCY ANEMIA SECONDARY TO INADEQUATE DIETARY IRON INTAKE: ICD-10-CM

## 2023-11-14 PROCEDURE — 96365 THER/PROPH/DIAG IV INF INIT: CPT

## 2023-11-14 RX ORDER — SODIUM CHLORIDE 9 MG/ML
20 INJECTION, SOLUTION INTRAVENOUS ONCE
Status: CANCELLED | OUTPATIENT
Start: 2023-11-21

## 2023-11-14 RX ORDER — DIPHENHYDRAMINE HCL 25 MG
25 TABLET ORAL ONCE
Status: CANCELLED | OUTPATIENT
Start: 2023-11-21 | End: 2023-11-21

## 2023-11-14 RX ORDER — DIPHENHYDRAMINE HCL 25 MG
25 TABLET ORAL ONCE
Status: COMPLETED | OUTPATIENT
Start: 2023-11-14 | End: 2023-11-14

## 2023-11-14 RX ORDER — SODIUM CHLORIDE 9 MG/ML
20 INJECTION, SOLUTION INTRAVENOUS ONCE
Status: COMPLETED | OUTPATIENT
Start: 2023-11-14 | End: 2023-11-14

## 2023-11-14 RX ADMIN — SODIUM CHLORIDE 20 ML/HR: 0.9 INJECTION, SOLUTION INTRAVENOUS at 08:15

## 2023-11-14 RX ADMIN — SODIUM CHLORIDE 125 MG: 9 INJECTION, SOLUTION INTRAVENOUS at 08:50

## 2023-11-14 RX ADMIN — DIPHENHYDRAMINE HYDROCHLORIDE 25 MG: 25 TABLET ORAL at 08:15

## 2023-11-14 NOTE — PROGRESS NOTES
Pt to clinic for Ferrlecit. Pt offers no complaints. Denies s/s of infection and recent abx use. Pt tolerated infusion without complications. PIV removed. Pt aware of next appointment. AVS declined.

## 2023-11-21 ENCOUNTER — HOSPITAL ENCOUNTER (OUTPATIENT)
Dept: INFUSION CENTER | Facility: CLINIC | Age: 43
Discharge: HOME | End: 2023-11-21
Payer: COMMERCIAL

## 2023-11-21 VITALS
HEART RATE: 105 BPM | RESPIRATION RATE: 18 BRPM | TEMPERATURE: 96.9 F | DIASTOLIC BLOOD PRESSURE: 60 MMHG | SYSTOLIC BLOOD PRESSURE: 128 MMHG

## 2023-11-21 DIAGNOSIS — D50.8 IRON DEFICIENCY ANEMIA SECONDARY TO INADEQUATE DIETARY IRON INTAKE: ICD-10-CM

## 2023-11-21 DIAGNOSIS — N92.0 MENORRHAGIA WITH REGULAR CYCLE: Primary | ICD-10-CM

## 2023-11-21 PROCEDURE — 96365 THER/PROPH/DIAG IV INF INIT: CPT

## 2023-11-21 RX ORDER — SODIUM CHLORIDE 9 MG/ML
20 INJECTION, SOLUTION INTRAVENOUS ONCE
Status: CANCELLED | OUTPATIENT
Start: 2023-11-28

## 2023-11-21 RX ORDER — SODIUM CHLORIDE 9 MG/ML
20 INJECTION, SOLUTION INTRAVENOUS ONCE
Status: COMPLETED | OUTPATIENT
Start: 2023-11-21 | End: 2023-11-21

## 2023-11-21 RX ORDER — DIPHENHYDRAMINE HCL 25 MG
25 TABLET ORAL ONCE
Status: COMPLETED | OUTPATIENT
Start: 2023-11-21 | End: 2023-11-21

## 2023-11-21 RX ORDER — DIPHENHYDRAMINE HCL 25 MG
25 TABLET ORAL ONCE
Status: CANCELLED | OUTPATIENT
Start: 2023-11-28 | End: 2023-11-28

## 2023-11-21 RX ADMIN — SODIUM CHLORIDE 125 MG: 9 INJECTION, SOLUTION INTRAVENOUS at 08:44

## 2023-11-21 RX ADMIN — SODIUM CHLORIDE 20 ML/HR: 0.9 INJECTION, SOLUTION INTRAVENOUS at 08:16

## 2023-11-21 RX ADMIN — DIPHENHYDRAMINE HYDROCHLORIDE 25 MG: 25 TABLET ORAL at 08:16

## 2023-11-21 NOTE — PROGRESS NOTES
Pt presents for Ferrlecit infusion offering no complaints. Pt tolerated treatment without incident. AVS declined, next appointment reviewed.

## 2023-11-27 RX ORDER — DIPHENHYDRAMINE HCL 25 MG
25 TABLET ORAL ONCE
Status: CANCELLED | OUTPATIENT
Start: 2023-11-28 | End: 2023-11-28

## 2023-11-27 RX ORDER — SODIUM CHLORIDE 9 MG/ML
20 INJECTION, SOLUTION INTRAVENOUS ONCE
Status: CANCELLED | OUTPATIENT
Start: 2023-11-28

## 2023-11-28 ENCOUNTER — HOSPITAL ENCOUNTER (OUTPATIENT)
Dept: INFUSION CENTER | Facility: CLINIC | Age: 43
Discharge: HOME | End: 2023-11-28
Payer: COMMERCIAL

## 2023-11-28 ENCOUNTER — VBI (OUTPATIENT)
Dept: ADMINISTRATIVE | Facility: OTHER | Age: 43
End: 2023-11-28

## 2023-11-28 VITALS
RESPIRATION RATE: 18 BRPM | SYSTOLIC BLOOD PRESSURE: 104 MMHG | TEMPERATURE: 97 F | DIASTOLIC BLOOD PRESSURE: 51 MMHG | HEART RATE: 91 BPM

## 2023-11-28 DIAGNOSIS — N92.0 MENORRHAGIA WITH REGULAR CYCLE: Primary | ICD-10-CM

## 2023-11-28 DIAGNOSIS — D50.8 IRON DEFICIENCY ANEMIA SECONDARY TO INADEQUATE DIETARY IRON INTAKE: ICD-10-CM

## 2023-11-28 PROCEDURE — 96365 THER/PROPH/DIAG IV INF INIT: CPT

## 2023-11-28 RX ORDER — DIPHENHYDRAMINE HCL 25 MG
25 TABLET ORAL ONCE
Status: CANCELLED | OUTPATIENT
Start: 2023-12-05 | End: 2023-12-05

## 2023-11-28 RX ORDER — DIPHENHYDRAMINE HCL 25 MG
25 TABLET ORAL ONCE
Status: COMPLETED | OUTPATIENT
Start: 2023-11-28 | End: 2023-11-28

## 2023-11-28 RX ORDER — SODIUM CHLORIDE 9 MG/ML
20 INJECTION, SOLUTION INTRAVENOUS ONCE
Status: COMPLETED | OUTPATIENT
Start: 2023-11-28 | End: 2023-11-28

## 2023-11-28 RX ORDER — SODIUM CHLORIDE 9 MG/ML
20 INJECTION, SOLUTION INTRAVENOUS ONCE
Status: CANCELLED | OUTPATIENT
Start: 2023-12-05

## 2023-11-28 RX ADMIN — SODIUM CHLORIDE 20 ML/HR: 0.9 INJECTION, SOLUTION INTRAVENOUS at 08:46

## 2023-11-28 RX ADMIN — SODIUM CHLORIDE 125 MG: 9 INJECTION, SOLUTION INTRAVENOUS at 09:22

## 2023-11-28 RX ADMIN — DIPHENHYDRAMINE HYDROCHLORIDE 25 MG: 25 TABLET ORAL at 08:46

## 2023-11-28 NOTE — PROGRESS NOTES
Pt to clinic for ferrlecit, offers no complaints today, confirmed with Jerome Neff RN per Dr. Reyez He pt to receive last treatment of ferrlecit today, pt tolerated infusion without complications, aware of this is her last infusion appointment, PIV removed, avs printed and reviewed.

## 2024-01-03 ENCOUNTER — TELEPHONE (OUTPATIENT)
Dept: OTHER | Facility: HOSPITAL | Age: 44
End: 2024-01-03

## 2024-01-03 NOTE — TELEPHONE ENCOUNTER
Patient is calling regarding cancelling an appointment.    Date/Time: 01/05/24 @ 9am    Patient was rescheduled: YES [] NO [x]    Patient requesting call back to reschedule: YES [x] NO []

## 2024-04-15 ENCOUNTER — VBI (OUTPATIENT)
Dept: ADMINISTRATIVE | Facility: OTHER | Age: 44
End: 2024-04-15

## 2024-05-09 ENCOUNTER — VBI (OUTPATIENT)
Dept: ADMINISTRATIVE | Facility: OTHER | Age: 44
End: 2024-05-09

## 2024-08-15 ENCOUNTER — VBI (OUTPATIENT)
Dept: ADMINISTRATIVE | Facility: OTHER | Age: 44
End: 2024-08-15

## 2024-08-15 NOTE — TELEPHONE ENCOUNTER
08/15/24 7:05 AM     Chart reviewed for Pap Smear (HPV) aka Cervical Cancer Screening ; nothing is submitted to the patient's insurance at this time.     SYLVIE VELÁSQUEZ MA   [facility]

## 2025-05-07 DIAGNOSIS — Z12.11 SCREENING FOR COLON CANCER: Primary | ICD-10-CM
